# Patient Record
Sex: MALE | Race: WHITE | NOT HISPANIC OR LATINO | Employment: FULL TIME | URBAN - NONMETROPOLITAN AREA
[De-identification: names, ages, dates, MRNs, and addresses within clinical notes are randomized per-mention and may not be internally consistent; named-entity substitution may affect disease eponyms.]

---

## 2017-08-29 ENCOUNTER — OFFICE VISIT (OUTPATIENT)
Dept: URGENT CARE | Facility: PHYSICIAN GROUP | Age: 32
End: 2017-08-29
Payer: COMMERCIAL

## 2017-08-29 VITALS
DIASTOLIC BLOOD PRESSURE: 78 MMHG | WEIGHT: 162 LBS | HEIGHT: 70 IN | OXYGEN SATURATION: 97 % | TEMPERATURE: 99.9 F | RESPIRATION RATE: 18 BRPM | HEART RATE: 96 BPM | SYSTOLIC BLOOD PRESSURE: 122 MMHG | BODY MASS INDEX: 23.19 KG/M2

## 2017-08-29 DIAGNOSIS — J01.40 ACUTE PANSINUSITIS, RECURRENCE NOT SPECIFIED: ICD-10-CM

## 2017-08-29 PROCEDURE — 99214 OFFICE O/P EST MOD 30 MIN: CPT | Performed by: PHYSICIAN ASSISTANT

## 2017-08-29 RX ORDER — FLUTICASONE PROPIONATE 50 MCG
2 SPRAY, SUSPENSION (ML) NASAL DAILY
Qty: 1 BOTTLE | Refills: 1 | Status: SHIPPED | OUTPATIENT
Start: 2017-08-29 | End: 2017-09-03

## 2017-08-29 RX ORDER — AMOXICILLIN AND CLAVULANATE POTASSIUM 875; 125 MG/1; MG/1
1 TABLET, FILM COATED ORAL 2 TIMES DAILY
Qty: 14 TAB | Refills: 0 | Status: SHIPPED | OUTPATIENT
Start: 2017-08-29 | End: 2017-09-03

## 2017-08-29 ASSESSMENT — ENCOUNTER SYMPTOMS
FEVER: 0
SORE THROAT: 0
SINUS PRESSURE: 1
COUGH: 1
SHORTNESS OF BREATH: 0
WHEEZING: 0
HEADACHES: 1
CHILLS: 1

## 2017-08-29 NOTE — PROGRESS NOTES
"Subjective:      Maurilio Andres is a 31 y.o. male who presents with Sinus Problem (x2wks nasal pressure/pain, cough, drainage)            Almost 3 week history of rhinorrhea, congestion, sinus pressure/pain, cough, and generalized not feeling well. Over the last several days things have been worsening. He reports that he developed chills yesterday which were quite bothersome.      Sinus Problem   This is a new problem. The current episode started 1 to 4 weeks ago. The problem has been gradually worsening since onset. There has been no fever. The pain is moderate. Associated symptoms include chills, congestion, coughing, headaches and sinus pressure. Pertinent negatives include no shortness of breath or sore throat. Past treatments include nothing. The treatment provided no relief.       Review of Systems   Constitutional: Positive for chills. Negative for fever.   HENT: Positive for congestion and sinus pressure. Negative for sore throat.    Respiratory: Positive for cough. Negative for shortness of breath and wheezing.    Neurological: Positive for headaches.     Allergies:Review of patient's allergies indicates no known allergies.    Current Outpatient Prescriptions Ordered in [facility]   Medication Sig Dispense Refill   • amoxicillin-clavulanate (AUGMENTIN) 875-125 MG Tab Take 1 Tab by mouth 2 times a day for 7 days. 14 Tab 0   • fluticasone (FLONASE) 50 MCG/ACT nasal spray Spray 2 Sprays in nose every day. 1 Bottle 1     No current Epic-ordered facility-administered medications on file.        No past medical history on file.    Social History   Substance Use Topics   • Smoking status: Never Smoker   • Smokeless tobacco: Never Used   • Alcohol use No       No family status information on file.   History reviewed. No pertinent family history.       Objective:     /78   Pulse 96   Temp 37.7 °C (99.9 °F)   Resp 18   Ht 1.778 m (5' 10\")   Wt 73.5 kg (162 lb)   SpO2 97%   BMI 23.24 kg/m²      Physical Exam "   Constitutional: He is oriented to person, place, and time. He appears well-developed and well-nourished. No distress.   Appears uncomfortable   HENT:   Head: Normocephalic and atraumatic.   Right Ear: External ear normal.   Left Ear: External ear normal.   Mouth/Throat: Oropharynx is clear and moist.   Canals and tympanic membranes unremarkable. Nasal mucosal edema with mucopurulent nasal discharge. Frontal/maxillary sinus tenderness   Eyes: Right eye exhibits no discharge. Left eye exhibits no discharge.   Neck: Normal range of motion. Neck supple.   Cardiovascular: Normal rate and regular rhythm.    Pulmonary/Chest: Effort normal and breath sounds normal. He has no wheezes. He has no rales.   Neurological: He is alert and oriented to person, place, and time.   Skin: Skin is warm and dry. He is not diaphoretic.   Psychiatric: He has a normal mood and affect. His behavior is normal. Judgment and thought content normal.   Nursing note and vitals reviewed.              Assessment/Plan:     1. Acute pansinusitis, recurrence not specified      Ongoing for about 3 weeks, worsening over the last few days. Nasal mucosal edema with mucopurulent nasal discharge and sinus tenderness. Start Augmentin, Flonas       Elsevier Interactive Patient Education given:Sinusitis    Use Tylenol and/or ibuprofen as needed for pain or fever.  Use a Emir Med, Neti Pot, or other nasal irrigation device daily.  Use Flonase daily.  May try a short course of a decongestant such as Sudafed.  May try Afrin nasal spray for 3-5 days and then discontinue use.  May try an over-the-counter antihistamine such as Zyrtec, Claritin, or Allegra.  Use a cool mist humidifier with distilled water.  Elevate the head of your bed a few inches.  Drink plenty of fluids and get adequate rest.  Follow up with primary care provider in a few days if not improving.  Return for new or worsening symptoms.      Please note that this dictation was created using voice  recognition software. I have made every reasonable attempt to correct obvious errors, but I expect that there are errors of grammar and possibly content that I did not discover before finalizing the note.

## 2017-08-29 NOTE — PATIENT INSTRUCTIONS
Use Tylenol and/or ibuprofen as needed for pain or fever.  Use a Emir Med, Neti Pot, or other nasal irrigation device daily.  Use Flonase daily.  May try a short course of a decongestant such as Sudafed.  May try Afrin nasal spray for 3-5 days and then discontinue use.  May try an over-the-counter antihistamine such as Zyrtec, Claritin, or Allegra.  Use a cool mist humidifier with distilled water.  Elevate the head of your bed a few inches.  Drink plenty of fluids and get adequate rest.  Follow up with primary care provider in a few days if not improving.  Return for new or worsening symptoms.      Sinusitis, Adult  Sinusitis is redness, soreness, and inflammation of the paranasal sinuses. Paranasal sinuses are air pockets within the bones of your face. They are located beneath your eyes, in the middle of your forehead, and above your eyes. In healthy paranasal sinuses, mucus is able to drain out, and air is able to circulate through them by way of your nose. However, when your paranasal sinuses are inflamed, mucus and air can become trapped. This can allow bacteria and other germs to grow and cause infection.  Sinusitis can develop quickly and last only a short time (acute) or continue over a long period (chronic). Sinusitis that lasts for more than 12 weeks is considered chronic.  CAUSES  Causes of sinusitis include:  · Allergies.  · Structural abnormalities, such as displacement of the cartilage that separates your nostrils (deviated septum), which can decrease the air flow through your nose and sinuses and affect sinus drainage.  · Functional abnormalities, such as when the small hairs (cilia) that line your sinuses and help remove mucus do not work properly or are not present.  SIGNS AND SYMPTOMS  Symptoms of acute and chronic sinusitis are the same. The primary symptoms are pain and pressure around the affected sinuses. Other symptoms include:  · Upper toothache.  · Earache.  · Headache.  · Bad  breath.  · Decreased sense of smell and taste.  · A cough, which worsens when you are lying flat.  · Fatigue.  · Fever.  · Thick drainage from your nose, which often is green and may contain pus (purulent).  · Swelling and warmth over the affected sinuses.  DIAGNOSIS  Your health care provider will perform a physical exam. During your exam, your health care provider may perform any of the following to help determine if you have acute sinusitis or chronic sinusitis:  · Look in your nose for signs of abnormal growths in your nostrils (nasal polyps).  · Tap over the affected sinus to check for signs of infection.  · View the inside of your sinuses using an imaging device that has a light attached (endoscope).  If your health care provider suspects that you have chronic sinusitis, one or more of the following tests may be recommended:  · Allergy tests.  · Nasal culture. A sample of mucus is taken from your nose, sent to a lab, and screened for bacteria.  · Nasal cytology. A sample of mucus is taken from your nose and examined by your health care provider to determine if your sinusitis is related to an allergy.  TREATMENT  Most cases of acute sinusitis are related to a viral infection and will resolve on their own within 10 days. Sometimes, medicines are prescribed to help relieve symptoms of both acute and chronic sinusitis. These may include pain medicines, decongestants, nasal steroid sprays, or saline sprays.  However, for sinusitis related to a bacterial infection, your health care provider will prescribe antibiotic medicines. These are medicines that will help kill the bacteria causing the infection.  Rarely, sinusitis is caused by a fungal infection. In these cases, your health care provider will prescribe antifungal medicine.  For some cases of chronic sinusitis, surgery is needed. Generally, these are cases in which sinusitis recurs more than 3 times per year, despite other treatments.  HOME CARE  INSTRUCTIONS  · Drink plenty of water. Water helps thin the mucus so your sinuses can drain more easily.  · Use a humidifier.  · Inhale steam 3-4 times a day (for example, sit in the bathroom with the shower running).  · Apply a warm, moist washcloth to your face 3-4 times a day, or as directed by your health care provider.  · Use saline nasal sprays to help moisten and clean your sinuses.  · Take medicines only as directed by your health care provider.  · If you were prescribed either an antibiotic or antifungal medicine, finish it all even if you start to feel better.  SEEK IMMEDIATE MEDICAL CARE IF:  · You have increasing pain or severe headaches.  · You have nausea, vomiting, or drowsiness.  · You have swelling around your face.  · You have vision problems.  · You have a stiff neck.  · You have difficulty breathing.     This information is not intended to replace advice given to you by your health care provider. Make sure you discuss any questions you have with your health care provider.     Document Released: 12/18/2006 Document Revised: 01/08/2016 Document Reviewed: 01/01/2013  [employer] Interactive Patient Education ©2016 [employer]

## 2017-09-03 ENCOUNTER — OFFICE VISIT (OUTPATIENT)
Dept: URGENT CARE | Facility: PHYSICIAN GROUP | Age: 32
End: 2017-09-03
Payer: COMMERCIAL

## 2017-09-03 VITALS
BODY MASS INDEX: 23.82 KG/M2 | RESPIRATION RATE: 16 BRPM | DIASTOLIC BLOOD PRESSURE: 86 MMHG | SYSTOLIC BLOOD PRESSURE: 148 MMHG | OXYGEN SATURATION: 98 % | HEART RATE: 114 BPM | WEIGHT: 166 LBS | TEMPERATURE: 98.1 F

## 2017-09-03 DIAGNOSIS — J01.00 ACUTE NON-RECURRENT MAXILLARY SINUSITIS: ICD-10-CM

## 2017-09-03 PROCEDURE — 99214 OFFICE O/P EST MOD 30 MIN: CPT | Performed by: PHYSICIAN ASSISTANT

## 2017-09-03 RX ORDER — DOXYCYCLINE HYCLATE 100 MG/1
100 CAPSULE ORAL 2 TIMES DAILY
Qty: 20 CAP | Refills: 0 | Status: SHIPPED | OUTPATIENT
Start: 2017-09-03 | End: 2018-10-19

## 2017-09-03 RX ORDER — METHYLPREDNISOLONE 4 MG/1
TABLET ORAL
Qty: 21 TAB | Refills: 0 | Status: SHIPPED | OUTPATIENT
Start: 2017-09-03 | End: 2018-10-19

## 2017-09-03 NOTE — PROGRESS NOTES
Chief Complaint   Patient presents with   • Cough     with congestion and sore throat x 3 weeks        HISTORY OF PRESENT ILLNESS: Patient is a 31 y.o. male who presents today for 3 weeks of feeling unwell.  He was seen and dx with acute maxillary sinusitis and put on Augmentin and Flonase.  He has not gotten any relief with this regimen.  He does not have allergies and all of this seemed to start with a cold/sore throat/etc.   He feels pressure throughout his sinuses with no focal area of pain.  No dizziness.  No nausea or vomiting.  No chest congestion, SOB or chest pain.     There are no active problems to display for this patient.      Allergies:Review of patient's allergies indicates no known allergies.    Current Outpatient Prescriptions Ordered in [facility]   Medication Sig Dispense Refill   • fluticasone (FLONASE) 50 MCG/ACT nasal spray Spray 2 Sprays in nose every day. 1 Bottle 1     No current Epic-ordered facility-administered medications on file.        No past medical history on file.    Social History   Substance Use Topics   • Smoking status: Never Smoker   • Smokeless tobacco: Never Used   • Alcohol use No       No family status information on file.   History reviewed. No pertinent family history.    ROS:  Review of Systems   Constitutional: Negative for fever, chills, weight loss and malaise/fatigue.   HENT: Negative for ear pain, nosebleeds, congestion, sore throat and neck pain.    Eyes: Negative for blurred vision.   Respiratory: Negative for cough, sputum production, shortness of breath and wheezing.    Cardiovascular: Negative for chest pain, palpitations, orthopnea and leg swelling.   Gastrointestinal: Negative for heartburn, nausea, vomiting and abdominal pain.   All other systems reviewed and are negative.       Exam:  Blood pressure 148/86, pulse (!) 114, temperature 36.7 °C (98.1 °F), resp. rate 16, weight 75.3 kg (166 lb), SpO2 98 %.  General:  Well nourished, well developed male in NAD  Eyes:  PERRLA, EOM within normal limits, no conjunctival injection, no scleral icterus, visual fields and acuity grossly intact.  Ears: Normal shape and symmetry, no tenderness, no discharge. External canals are without any significant edema or erythema. Tympanic membranes are without any inflammation, no effusion. Gross auditory acuity is intact  Nose: Symmetrical, mild bilateral maxillary sinuses TTP, boggy erythematous turbinates with purulent rhinorrhea.   Mouth: reasonable hygiene, no erythema exudates or tonsillar enlargement.  Purulent post nasal drainage.   Neck: no masses, range of motion within normal limits, no tracheal deviation. No lymphadenopathy  Pulmonary: Normal respiratory effort, no wheezes, crackles, or rhonchi.  Cardiovascular: regular rate and rhythm without murmurs, rubs, or gallops.  Skin: No visible rashes or lesion. Warm, pink, dry.   Extremities: no clubbing, cyanosis, or edema.  Neuro: A&O x 3. Speech normal/clear.  Normal gait.         Assessment/Plan:  1. Acute non-recurrent maxillary sinusitis  MethylPREDNISolone (MEDROL DOSEPAK) 4 MG Tablet Therapy Pack    doxycycline (VIBRAMYCIN) 100 MG Cap       -consistent with bacterial sinusitis.   Sinus care at home discussed, ie Netti Pot  -fluids, rest emphasized.  Flonase/Allegra or similar for post nasal drainage trial.   -humidifier/steam inhalations to soothe lungs.   -rx as above.  Switch Augmentin to Doxy and add Medrol dose pack for inflammation    Supportive care, differential diagnoses, and indications for immediate follow-up discussed with patient.   Pathogenesis of diagnosis discussed including typical length and natural progression.   Instructed to return to clinic or nearest emergency department for any change in condition, further concerns, or worsening of symptoms.  Patient states understanding of the plan of care and discharge instructions.      Paige Church P.A.-C.

## 2018-10-01 ENCOUNTER — OCCUPATIONAL MEDICINE (OUTPATIENT)
Dept: URGENT CARE | Facility: PHYSICIAN GROUP | Age: 33
End: 2018-10-01
Payer: COMMERCIAL

## 2018-10-01 ENCOUNTER — NON-PROVIDER VISIT (OUTPATIENT)
Dept: URGENT CARE | Facility: PHYSICIAN GROUP | Age: 33
End: 2018-10-01

## 2018-10-01 VITALS
TEMPERATURE: 99.1 F | RESPIRATION RATE: 16 BRPM | HEART RATE: 110 BPM | SYSTOLIC BLOOD PRESSURE: 150 MMHG | DIASTOLIC BLOOD PRESSURE: 80 MMHG | WEIGHT: 165 LBS | OXYGEN SATURATION: 98 % | BODY MASS INDEX: 23.62 KG/M2 | HEIGHT: 70 IN

## 2018-10-01 DIAGNOSIS — S09.90XA INJURY OF HEAD, INITIAL ENCOUNTER: ICD-10-CM

## 2018-10-01 DIAGNOSIS — S16.1XXA STRAIN OF NECK MUSCLE, INITIAL ENCOUNTER: ICD-10-CM

## 2018-10-01 DIAGNOSIS — Z02.1 PRE-EMPLOYMENT DRUG SCREENING: ICD-10-CM

## 2018-10-01 LAB
BREATH ALCOHOL COMMENT: NORMAL
POC BREATHALIZER: 0 PERCENT (ref 0–0.01)

## 2018-10-01 PROCEDURE — 99203 OFFICE O/P NEW LOW 30 MIN: CPT | Mod: 29 | Performed by: PHYSICIAN ASSISTANT

## 2018-10-01 PROCEDURE — 82075 ASSAY OF BREATH ETHANOL: CPT | Performed by: PHYSICIAN ASSISTANT

## 2018-10-01 PROCEDURE — 8907 PR URINE COLLECT ONLY: Performed by: PHYSICIAN ASSISTANT

## 2018-10-01 RX ORDER — DICLOFENAC SODIUM 75 MG/1
75 TABLET, DELAYED RELEASE ORAL NIGHTLY PRN
Qty: 15 TAB | Refills: 0 | Status: SHIPPED | OUTPATIENT
Start: 2018-10-01 | End: 2018-10-19

## 2018-10-01 RX ORDER — CYCLOBENZAPRINE HCL 10 MG
10 TABLET ORAL 3 TIMES DAILY PRN
Qty: 30 TAB | Refills: 0 | Status: SHIPPED | OUTPATIENT
Start: 2018-10-01 | End: 2018-10-19

## 2018-10-01 NOTE — LETTER
[facility]  [facility] [place] [address]  Phone:  [phone] - Fax:  [phone]   [facility] Progress Report and Disability Certification  Date of Service: 10/1/2018   No Show:  No  Date / Time of Next Visit: 10/8/2018   Claim Information   Patient Name: Maurilio Andres  Claim Number:     Employer:   [employer] Date of Injury: 9/29/2018     Insurer / TPA: Monica Goldsmith  ID / SSN:     Occupation:   Diagnosis: Diagnoses of Injury of head, initial encounter and Strain of neck muscle, initial encounter were pertinent to this visit.    Medical Information   Related to Industrial Injury? Yes    Subjective Complaints:  Left temporal pain, left-sided neck pain after injury at work 2 days ago   Objective Findings: Neurologic: Alert and oriented x3, cranial nerves II through XII checked and are grossly intact, negative Romberg, normal gait.  Musculoskeletal: Tenderness to palpation of the paraspinous musculature and tissues on the left side of his C-spine, no vertebral point tenderness.  He also has some tenderness to his temple area where he was hit     Pre-Existing Condition(s):     Assessment:   Initial Visit    Status: Additional Care Required  Comments:Follow-up in 1 week  Permanent Disability:No    Plan: MedicationMedication (NOT at Work)  Comments:Do not take muscle relaxant while driving or at work    Diagnostics:      Comments:       Disability Information   Status: Released to Full Duty    From:  10/1/2018  Through: 10/8/2018 Restrictions are: Temporary   Physical Restrictions   Sitting:    Standing:    Stooping:    Bending:      Squatting:    Walking:    Climbing:    Pushing:      Pulling:    Other:    Reaching Above Shoulder (L):   Reaching Above Shoulder (R):       Reaching Below Shoulder (L):    Reaching Below Shoulder (R):      Not to exceed Weight Limits   Carrying(hrs):   Weight Limit(lb):   Lifting(hrs):   Weight  Limit(lb):        Comments: May return to work without restrictions, however follow-up required in 1 week    Repetitive Actions   Hands: i.e. Fine Manipulations from Grasping:     Feet: i.e. Operating Foot Controls:     Driving / Operate Machinery:     Physician Name: Priscila Cook P.A.-C. Physician Signature: PRISCILA Aguillon P.A.-C. e-Signature: Dr. Tavo Alfredo, Medical Director   Clinic Name / Location: [address] [address] Clinic Phone Number: Dept: [phone]   Appointment Time: 12:40 Pm Visit Start Time: 12:48 PM   Check-In Time:  12:46 Pm Visit Discharge Time:  1:13 PM   Original-Treating Physician or Chiropractor    Page 2-Insurer/TPA    Page 3-Employer    Page 4-Employee

## 2018-10-01 NOTE — PROGRESS NOTES
Chief Complaint   Patient presents with   • Headache     hit left side of head on saturday, constant headache since        HISTORY OF PRESENT ILLNESS: Patient is a 32 y.o. male who presents today because he is here for work comp injury.    Date of injury 9/29/2018 at approximately 10 AM.  This is his first visit for this injury.    He was changing some equipment and a tight spot at work 2 days ago and hit the left temple area on a piece of metal.  He did not have any loss of consciousness, no visual disturbances or changes, no nausea or vomiting.  He said some swear words and went about his business.  He worked the entire next day.  He took some aspirin and some over-the-counter anti-inflammatories and it did not seem to help.  The pain radiates down the left side of his neck now.      There are no active problems to display for this patient.      Allergies:Patient has no known allergies.    Current Outpatient Prescriptions Ordered in [facility]   Medication Sig Dispense Refill   • cyclobenzaprine (FLEXERIL) 10 MG Tab Take 1 Tab by mouth 3 times a day as needed. 30 Tab 0   • diclofenac EC (VOLTAREN) 75 MG Tablet Delayed Response Take 1 Tab by mouth at bedtime as needed. 15 Tab 0   • MethylPREDNISolone (MEDROL DOSEPAK) 4 MG Tablet Therapy Pack Take as directed 21 Tab 0   • doxycycline (VIBRAMYCIN) 100 MG Cap Take 1 Cap by mouth 2 times a day. 20 Cap 0     No current Epic-ordered facility-administered medications on file.        History reviewed. No pertinent past medical history.    Social History   Substance Use Topics   • Smoking status: Never Smoker   • Smokeless tobacco: Never Used   • Alcohol use Yes      Comment: socially        No family status information on file.   History reviewed. No pertinent family history.    ROS:  Review of Systems   Constitutional: Negative for fever, chills, weight loss and malaise/fatigue.   HENT: Negative for ear pain, nosebleeds, congestion, sore throat and positive for neck pain.   "  Eyes: Negative for blurred vision.   Respiratory: Negative for cough, sputum production, shortness of breath and wheezing.    Cardiovascular: Negative for chest pain, palpitations, orthopnea and leg swelling.   Gastrointestinal: Negative for heartburn, nausea, vomiting and abdominal pain.   Genitourinary: Negative for dysuria, urgency and frequency.     Exam:  Blood pressure 150/80, pulse (!) 110, temperature 37.3 °C (99.1 °F), resp. rate 16, height 1.778 m (5' 10\"), weight 74.8 kg (165 lb), SpO2 98 %.  General:  Well nourished, well developed male in NAD  Head:Normocephalic, atraumatic  Eyes: PERRLA, EOM within normal limits, no conjunctival injection, no scleral icterus, visual fields and acuity grossly intact.  Ears: Normal shape and symmetry, no tenderness, no discharge. External canals are without any significant edema or erythema. Tympanic membranes are without any inflammation, no effusion. Gross auditory acuity is intact  Nose: Symmetrical without tenderness, no discharge.  Mouth: reasonable hygiene, no erythema exudates or tonsillar enlargement.  Neck: no masses, range of motion within normal limits, no tracheal deviation. No obvious thyroid enlargement.  Extremities: no clubbing, cyanosis, or edema.  Neurologic: Alert and oriented x3, cranial nerves II through XII checked and are grossly intact, negative Romberg, normal gait.  Musculoskeletal: Tenderness to palpation of the paraspinous musculature and tissues on the left side of his C-spine, no vertebral point tenderness.  He also has some tenderness to his temple area where he was hit    Please note that this dictation was created using voice recognition software. I have made every reasonable attempt to correct obvious errors, but I expect that there are errors of grammar and possibly content that I did not discover before finalizing the note.    Assessment/Plan:  1. Injury of head, initial encounter  diclofenac EC (VOLTAREN) 75 MG Tablet Delayed " Response   2. Strain of neck muscle, initial encounter  cyclobenzaprine (FLEXERIL) 10 MG Tab       Emergency room precautions given, follow-up in 1 week.

## 2018-10-01 NOTE — LETTER
"EMPLOYEE’S CLAIM FOR COMPENSATION/ REPORT OF INITIAL TREATMENT  FORM C-4    EMPLOYEE’S CLAIM - PROVIDE ALL INFORMATION REQUESTED   First Name  Maurilio Last Name  Dmitry Birthdate                    1985                Sex  male Claim Number   Home Address  [address] Age  32 y.o. Height  1.778 m (5' 10\") Weight  74.8 kg (165 lb) N     [facility] Zip  [address] Telephone  [phone] (home)    Mailing Address  [address] [facility]  [address] Primary Language Spoken  English    Insurer   Third Party   Monica Goldsmith   Employee's Occupation (Job Title) When Injury or Occupational Disease Occurred      Employer's Name    [employer] Telephone   [phone]   Employer Address   [address]  [facility]   [id number]   Date of Injury  9/29/2018               Hour of Injury  11:00 AM Date Employer Notified  10/1/2018 Last Day of Work after Injury or Occupational Disease  10/1/2018 Supervisor to Whom Injury Reported  Yeyo   Address or Location of Accident (if applicable)  [[place] [id number]]   What were you doing at the time of accident? (if applicable)  in a man lift changing rollers    How did this injury or occupational disease occur? (Be specific an answer in detail. Use additional sheet if necessary)  Working in a tight spot was lifting up a roller when done turned into mariia side of my head   If you believe that you have an occupational disease, when did you first have knowledge of the disability and it relationship to your employment?  NA Witnesses to the Accident  NA      Nature of Injury or Occupational Disease  Defer  Part(s) of Body Injured or Affected  Skull, ,     I certify that the above is true and correct to the best of my knowledge and that I have provided this information in order to obtain the benefits of [place] and " [place]  I hereby authorize any physician, chiropractor, surgeon, practitioner, or other person, any hospital, including [facility] or [facility], any medical service organization, any insurance company, or other institution or organization to release to each other, any medical or other information, including benefits paid or payable, pertinent to this injury or disease, except information relative to diagnosis, treatment and/or counseling for AIDS, psychological conditions, alcohol or controlled substances, for which I must give specific authorization.  A Photostat of this authorization shall be as valid as the original.     Date 10/01/2018   [facility]   Employee’s Signature   THIS REPORT MUST BE COMPLETED AND MAILED WITHIN 3 WORKING DAYS OF TREATMENT   [facility]  Name of Facility  [facility]   Date  10/1/2018 Diagnosis  (S09.90XA) Injury of head, initial encounter  (S16.1XXA) Strain of neck muscle, initial encounter Is there evidence the injured employee was under the influence of alcohol and/or another controlled substance at the time of accident?   Hour  12:48 PM Description of Injury or Disease  Diagnoses of Injury of head, initial encounter and Strain of neck muscle, initial encounter were pertinent to this visit. No   Treatment  Prescription anti-inflammatory, muscle relaxant in the evenings  Have you advised the patient to remain off work five days or more? No   X-Ray Findings      If Yes   From Date  To Date      From information given by the employee, together with medical evidence, can you directly connect this injury or occupational disease as job incurred?  Yes If No Full Duty  Yes Modified Duty      Is additional medical care by a physician indicated?  Yes  Comments:Follow-up in 1 week If Modified Duty, Specify any Limitations / Restrictions      Do you know of any previous injury  "or disease contributing to this condition or occupational disease?                            No   Date  10/1/2018 Print Doctor’s Name Priscila Cook P.A.-C. I certify the employer’s copy of  this form was mailed on:   Address  [address] Insurer’s Use Only     [facility] Zip  [address]    Provider’s Tax ID Number  [id number] Telephone  Dept: [phone]        e-JohnieTAPRISCILA ALARCON P.A.-C.   e-Signature: Dr. Tavo Alfredo, Medical Director Degree           ORIGINAL-TREATING PHYSICIAN OR CHIROPRACTOR    PAGE 2-INSURER/TPA    PAGE 3-EMPLOYER    PAGE 4-EMPLOYEE             Form C-4 (rev10/07)              BRIEF DESCRIPTION OF RIGHTS AND BENEFITS  (Pursuant to [place] 616C.050)    Notice of Injury or Occupational Disease (Incident Report Form C-1): If an injury or occupational disease (OD) arises out of and in the  course of employment, you must provide written notice to your employer as soon as practicable, but no later than 7 days after the accident or  OD. Your employer shall maintain a sufficient supply of the required forms.    Claim for Compensation (Form C-4): If medical treatment is sought, the form C-4 is available at the place of initial treatment. A completed  \"Claim for Compensation\" (Form C-4) must be filed within 90 days after an accident or OD. The treating physician or chiropractor must,  within 3 working days after treatment, complete and mail to the employer, the employer's insurer and third-party , the Claim for  Compensation.    Medical Treatment: If you require medical treatment for your on-the-job injury or OD, you may be required to select a physician or  chiropractor from a list provided by your workers’ compensation insurer, if it has contracted with an Organization for Managed Care (MCO) or  Preferred Provider Organization (PPO) or providers of health care. If your employer has not entered into a contract with an MCO or PPO, you  may select a physician or " chiropractor from the Panel of Physicians and Chiropractors. Any medical costs related to your industrial injury or  OD will be paid by your insurer.    Temporary Total Disability (TTD): If your doctor has certified that you are unable to work for a period of at least 5 consecutive days, or 5  cumulative days in a 20-day period, or places restrictions on you that your employer does not accommodate, you may be entitled to TTD  compensation.    Temporary Partial Disability (TPD): If the wage you receive upon reemployment is less than the compensation for TTD to which you are  entitled, the insurer may be required to pay you TPD compensation to make up the difference. TPD can only be paid for a maximum of 24  months.    Permanent Partial Disability (PPD): When your medical condition is stable and there is an indication of a PPD as a result of your injury or  OD, within 30 days, your insurer must arrange for an evaluation by a rating physician or chiropractor to determine the degree of your PPD. The  amount of your PPD award depends on the date of injury, the results of the PPD evaluation and your age and wage.    Permanent Total Disability (PTD): If you are medically certified by a treating physician or chiropractor as permanently and totally disabled  and have been granted a PTD status by your insurer, you are entitled to receive monthly benefits not to exceed 66 2/3% of your average  monthly wage. The amount of your PTD payments is subject to reduction if you previously received a PPD award.    Vocational Rehabilitation Services: You may be eligible for vocational rehabilitation services if you are unable to return to the job due to a  permanent physical impairment or permanent restrictions as a result of your injury or occupational disease.    Transportation and Per Fermin Reimbursement: You may be eligible for travel expenses and per fermin associated with medical treatment.    Reopening: You may be able to reopen your  claim if your condition worsens after claim closure.    Appeal Process: If you disagree with a written determination issued by the insurer or the insurer does not respond to your request, you may  appeal to the Department of Administration, , by following the instructions contained in your determination letter. You must  appeal the determination within 70 days from the date of the determination letter at [address], [address], [place], [place]  [address], or [address], [place], [place] [address]. If you disagree with the  decision, you may appeal to the  Department of Administration, . You must file your appeal within 30 days from the date of the  decision  letter at [address], [address], [place], [place] [address], or [address], [place], [place] [address]. If you  disagree with a decision of an , you may file a petition for judicial review with the District Court. You must do so within 30  days of the Appeal Officer’s decision. You may be represented by an  at your own expense or you may contact the [facility] for possible  representation.    [place]  for Injured Workers (NAIW): If you disagree with a  decision, you may request that NAIW represent you  without charge at an  Hearing. For information regarding denial of benefits, you may contact the [facility] at: [address], [address], [place] [address], [phone], or [address], [place] [address], [phone]    To File a Complaint with the Division: If you wish to file a complaint with the  of the Division of Industrial Relations (DIR),  please contact the Workers’ Compensation Section, [address], [address], [place], [place] [address], telephone [phone], or  [address], [place]  [address], telephone [phone].    For assistance with Workers’ Compensation Issues: you may contact the Office of the Governor Consumer Health Assistance, [address] [place] [address], [facility] [address], [place] [address], Toll Free 1-628.681.7507, Web site: [email or web address]., E-mail  [email or web address].                                                                                                                                                                                                                                   __________________________________________________________________                                                                   [phone]__________                Employee Name / Signature                                                                                                                                                       Date                                                                                                                                                                                                     D-2 (rev. 10/07)

## 2018-10-19 ENCOUNTER — OFFICE VISIT (OUTPATIENT)
Dept: MEDICAL GROUP | Facility: PHYSICIAN GROUP | Age: 33
End: 2018-10-19
Payer: COMMERCIAL

## 2018-10-19 VITALS
HEART RATE: 83 BPM | OXYGEN SATURATION: 98 % | BODY MASS INDEX: 23.62 KG/M2 | DIASTOLIC BLOOD PRESSURE: 78 MMHG | HEIGHT: 70 IN | WEIGHT: 165 LBS | TEMPERATURE: 97.5 F | RESPIRATION RATE: 16 BRPM | SYSTOLIC BLOOD PRESSURE: 116 MMHG

## 2018-10-19 DIAGNOSIS — R04.0 EPISTAXIS: ICD-10-CM

## 2018-10-19 DIAGNOSIS — Z13.29 SCREENING FOR THYROID DISORDER: ICD-10-CM

## 2018-10-19 DIAGNOSIS — J02.0 STREP PHARYNGITIS: ICD-10-CM

## 2018-10-19 DIAGNOSIS — Z13.6 SCREENING FOR CARDIOVASCULAR CONDITION: ICD-10-CM

## 2018-10-19 DIAGNOSIS — R90.89 ABNORMAL BRAIN CT: ICD-10-CM

## 2018-10-19 DIAGNOSIS — Z13.21 ENCOUNTER FOR VITAMIN DEFICIENCY SCREENING: ICD-10-CM

## 2018-10-19 DIAGNOSIS — R51.9 NONINTRACTABLE HEADACHE, UNSPECIFIED CHRONICITY PATTERN, UNSPECIFIED HEADACHE TYPE: ICD-10-CM

## 2018-10-19 PROCEDURE — 99214 OFFICE O/P EST MOD 30 MIN: CPT | Performed by: NURSE PRACTITIONER

## 2018-10-19 RX ORDER — AMOXICILLIN 500 MG/1
500 CAPSULE ORAL 2 TIMES DAILY
Qty: 20 CAP | Refills: 0 | Status: SHIPPED | OUTPATIENT
Start: 2018-10-19 | End: 2021-02-11

## 2018-10-19 ASSESSMENT — PAIN SCALES - GENERAL: PAINLEVEL: NO PAIN

## 2018-10-19 ASSESSMENT — PATIENT HEALTH QUESTIONNAIRE - PHQ9: CLINICAL INTERPRETATION OF PHQ2 SCORE: 0

## 2018-10-19 NOTE — ASSESSMENT & PLAN NOTE
Patient reports pharyngitis and sinus congestion, onset 1 week ago.  Wife and child both have tested positive for strep throat.  Patient denies malaise, fever, chills, otalgia, diarrhea, cough.  Rapid strep test done today and is positive.  Will treat with amoxicillin 500 mg twice a day for 10 days.  Reviewed instructions and side effects of medication with patient.  Advised to eat yogurt with live cultures or take probiotic while taking antibiotic.  Advised to take with food.  Patient will return to clinic if symptoms worsen or do not improve.

## 2018-10-19 NOTE — ASSESSMENT & PLAN NOTE
Patient reports frequent bloody noses occurring since childhood.  Reports will get a bloody nose every day 1 week and then not have a bloody nose for a couple weeks.  Patient reports bleeding lasts only a few minutes.  He has not tried anything for this.  I have advised him use saline nasal spray 4 times a day to see if this helps with frequency of bloody noses.  Patient denies allergies.  Will get CBC.  Patient will return to clinic if saline nasal spray does not seem to help decrease number of episodes of bloody noses.

## 2018-10-19 NOTE — ASSESSMENT & PLAN NOTE
Patient presents today with brain CT report from 10/5/18 done at [facility] in [place].  CT report impression is:  1.  No acute intracranial abnormalities are identified  2.  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  Cyst is an incidental finding as patient had CT scan for work related head injury.  Patient reports he has had headaches since childhood, occurring 2-3 times a week.  Sometimes headaches are resolved with aspirin, sometimes not.  If patient does not treat headache before he goes to bed, he wakes with a very bad hangover-like headache.  Denies photophobia, phonophobia, nausea with headaches.  Patient reports he can remain functional during headaches.  Will refer to neurology.

## 2018-10-19 NOTE — PROGRESS NOTES
CC: To establish care and for abnormality found on brain CT, sore throat, bloody noses    HISTORY OF THE PRESENT ILLNESS: Patient is a 32 y.o. male. This pleasant patient is here today, accompanied by wife and 3 children, to establish care and for the following health problems.  Patient works at [employer] full-time.  He does not exercise routinely.  He does not smoke.      Nonintractable headache  Patient presents today with brain CT report from 10/5/18 done at [facility] in [place].  CT report impression is:  1.  No acute intracranial abnormalities are identified  2.  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  Cyst is an incidental finding as patient had CT scan for work related head injury.  Patient reports he has had headaches since childhood, occurring 2-3 times a week.  Sometimes headaches are resolved with aspirin, sometimes not.  If patient does not treat headache before he goes to bed, he wakes with a very bad hangover-like headache.  Denies photophobia, phonophobia, nausea with headaches.  Patient reports he can remain functional during headaches.  Will refer to neurology.    Strep pharyngitis  Patient reports pharyngitis and sinus congestion, onset 1 week ago.  Wife and child both have tested positive for strep throat.  Patient denies malaise, fever, chills, otalgia, diarrhea, cough.  Rapid strep test done today and is positive.  Will treat with amoxicillin 500 mg twice a day for 10 days.  Reviewed instructions and side effects of medication with patient.  Advised to eat yogurt with live cultures or take probiotic while taking antibiotic.  Advised to take with food.  Patient will return to clinic if symptoms worsen or do not improve.    Epistaxis  Patient reports frequent bloody noses occurring since childhood.  Reports will get a bloody nose every day 1 week and then not have a bloody nose for a couple weeks.  Patient reports bleeding lasts only a few  "minutes.  He has not tried anything for this.  I have advised him use saline nasal spray 4 times a day to see if this helps with frequency of bloody noses.  Patient denies allergies.  Will get CBC.      Allergies: Patient has no known allergies.    Current Outpatient Prescriptions Ordered in [facility]   Medication Sig Dispense Refill   • amoxicillin (AMOXIL) 500 MG Cap Take 1 Cap by mouth 2 times a day. 20 Cap 0     No current Epic-ordered facility-administered medications on file.        No past medical history on file.    Past Surgical History:   Procedure Laterality Date   • ANKLE ORIF Right 2006       Social History   Substance Use Topics   • Smoking status: Former Smoker     Packs/day: 0.25     Years: 1.00     Types: Cigarettes     Quit date: 2012   • Smokeless tobacco: Never Used   • Alcohol use 0.6 oz/week     1 Cans of beer per week      Comment: socially        History reviewed. No pertinent family history.    ROS:     - Constitutional: Negative for fever, chills, unexpected weight change, and fatigue/generalized weakness.     - HEENT: As in HPI.      - Respiratory: Negative for cough, dyspnea, and wheezing.      - Cardiovascular: Negative for chest pain, palpitations, and bilateral lower extremity edema.     - Gastrointestinal: Negative for abdominal pain, melena, diarrhea, constipation.     - Genitourinary: Negative for dysuria, polyuria, hematuria.    - Musculoskeletal: Negative for myalgias, back pain, and joint pain.     - Skin: Negative for rash, itching, cyanotic skin color change.     - Neurological: Negative for dizziness, tingling, tremors, focal sensory deficit, focal weakness and headaches.     - Endo/Heme/Allergies: Does not bruise/bleed easily.     - Psychiatric/Behavioral: Negative for depression, suicidal/homicidal ideation and memory loss.          Exam: Blood pressure 116/78, pulse 83, temperature 36.4 °C (97.5 °F), temperature source Temporal, resp. rate 16, height 1.778 m (5' 10\"), weight " 74.8 kg (165 lb), SpO2 98 %. Body mass index is 23.68 kg/m².    General: Alert, pleasant, well nourished, well developed male in NAD  HEENT: Normocephalic. Eyes conjunctiva clear lids without ptosis, pupils equal and reactive to light, ears normal shape and contour, canals are clear bilaterally, tympanic membranes are pearly gray with good light reflex, nasal mucosa without erythema and drainage, oropharynx is erythematous without edema or exudates.   Neck: Supple without bruit. Thyroid is not enlarged.  Pulmonary: Clear to ausculation.  Normal effort. No rales, ronchi, or wheezing.  Cardiovascular: Normal rate and rhythm without murmur. Carotid and radial pulses are intact and equal bilaterally.  No lower extremity edema.  Abdomen: Soft, nontender, nondistended. Normal bowel sounds. Liver and spleen are not palpable  Neuro: CN 2-12 tested and grossly intact, strength testing in upper and lower extremities is 5/5 and equal bilaterally, lower extremity deep tendon reflexes 2+ and equal bilaterally, (Gross motor movement intact in all 4 extremities, Gross sensation intact to extremities and trunk, Gait grossly normal and not antalgic  Lymph: No cervical or supraclavicular lymph nodes are palpable  Skin: Warm and dry.  No obvious lesions.  Musculoskeletal: Normal gait.   Psych: Normal mood and affect. Alert and oriented. Judgment and insight is normal.    Rapid Strep: positive strep A    Please note that this dictation was created using voice recognition software. I have made every reasonable attempt to correct obvious errors, but I expect that there are errors of grammar and possibly content that I did not discover before finalizing the note.      Assessment/Plan  1. Abnormal brain CT    - REFERRAL TO NEUROLOGY    2. Nonintractable headache, unspecified chronicity pattern, unspecified headache type    - REFERRAL TO NEUROLOGY    3. Strep pharyngitis    - amoxicillin (AMOXIL) 500 MG Cap; Take 1 Cap by mouth 2 times a  day.  Dispense: 20 Cap; Refill: 0    4. Epistaxis    - CBC WITH DIFFERENTIAL; Future    5. Screening for cardiovascular condition    - CBC WITH DIFFERENTIAL; Future  - COMP METABOLIC PANEL; Future    6. Encounter for vitamin deficiency screening    - VITAMIN D,25 HYDROXY; Future    7. Screening for thyroid disorder    - TSH; Future  - FREE THYROXINE; Future    Patient will get lab work done.  I will notify him of lab results via my chart.  Patient will return to clinic annually or sooner if needed.

## 2018-11-28 ENCOUNTER — OFFICE VISIT (OUTPATIENT)
Dept: URGENT CARE | Facility: PHYSICIAN GROUP | Age: 33
End: 2018-11-28
Payer: COMMERCIAL

## 2018-11-28 VITALS
WEIGHT: 163 LBS | HEART RATE: 112 BPM | SYSTOLIC BLOOD PRESSURE: 140 MMHG | HEIGHT: 70 IN | BODY MASS INDEX: 23.34 KG/M2 | TEMPERATURE: 98 F | OXYGEN SATURATION: 98 % | DIASTOLIC BLOOD PRESSURE: 82 MMHG | RESPIRATION RATE: 14 BRPM

## 2018-11-28 DIAGNOSIS — J01.40 ACUTE PANSINUSITIS, RECURRENCE NOT SPECIFIED: ICD-10-CM

## 2018-11-28 PROCEDURE — 99214 OFFICE O/P EST MOD 30 MIN: CPT | Performed by: PHYSICIAN ASSISTANT

## 2018-11-28 RX ORDER — AMOXICILLIN AND CLAVULANATE POTASSIUM 875; 125 MG/1; MG/1
1 TABLET, FILM COATED ORAL 2 TIMES DAILY
Qty: 20 TAB | Refills: 0 | Status: SHIPPED | OUTPATIENT
Start: 2018-11-28 | End: 2018-12-08

## 2018-11-28 NOTE — PROGRESS NOTES
Chief Complaint   Patient presents with   • Pharyngitis       HISTORY OF PRESENT ILLNESS: Patient is a 33 y.o. male who presents today because he has a 10-day history of a biphasic illness.  He thought he had a cold, had some nasal congestion and a sore throat and cough.  Those symptoms went away after about 4 5 days and he felt good for a day or 2 and then his symptoms returned with worsening right-sided nasal sinus pain, pressure, drainage and congestion, sore throat, minimal cough.  He has been taking some over-the-counter decongestants for symptoms without improvement.    Patient Active Problem List    Diagnosis Date Noted   • Abnormal brain CT 10/19/2018   • Nonintractable headache 10/19/2018   • Strep pharyngitis 10/19/2018   • Epistaxis 10/19/2018       Allergies:Patient has no known allergies.    Current Outpatient Prescriptions Ordered in [facility]   Medication Sig Dispense Refill   • amoxicillin-clavulanate (AUGMENTIN) 875-125 MG Tab Take 1 Tab by mouth 2 times a day for 10 days. 20 Tab 0   • amoxicillin (AMOXIL) 500 MG Cap Take 1 Cap by mouth 2 times a day. 20 Cap 0     No current Epic-ordered facility-administered medications on file.        No past medical history on file.    Social History   Substance Use Topics   • Smoking status: Former Smoker     Packs/day: 0.25     Years: 1.00     Types: Cigarettes     Quit date: 2012   • Smokeless tobacco: Never Used   • Alcohol use 0.6 oz/week     1 Cans of beer per week      Comment: socially        Family Status   Relation Status   • Mo Alive, age 56y   • Fa Alive, age 65y   • Sis Alive   • Bro Alive   No family history on file.    ROS:  Review of Systems   Constitutional: Negative for fever, chills, weight loss and malaise/fatigue.   HENT: Negative for ear pain, nosebleeds, positive for congestion, sore throat and neck pain.    Eyes: Negative for blurred vision.   Respiratory: Positive for minimal cough, no sputum production, shortness of breath and wheezing.   "  Cardiovascular: Negative for chest pain, palpitations, orthopnea and leg swelling.   Gastrointestinal: Negative for heartburn, nausea, vomiting and abdominal pain.   Genitourinary: Negative for dysuria, urgency and frequency.     Exam:  Blood pressure 140/82, pulse (!) 112, temperature 36.7 °C (98 °F), temperature source Temporal, resp. rate 14, height 1.778 m (5' 10\"), weight 73.9 kg (163 lb), SpO2 98 %.  General:  Well nourished, well developed male in NAD  Head:Normocephalic, atraumatic  Eyes: PERRLA, EOM within normal limits, no conjunctival injection, no scleral icterus, visual fields and acuity grossly intact.  Ears: Normal shape and symmetry, no tenderness, no discharge. External canals are without any significant edema or erythema. Tympanic membranes are without any inflammation, no effusion. Gross auditory acuity is intact  Nose: Symmetrical without tenderness, no discharge.  Nasal mucosa on the right is erythematous and edematous with posterior nasal cavity exudate  Mouth: reasonable hygiene, no erythema exudates or tonsillar enlargement.  Neck: no masses, range of motion within normal limits, no tracheal deviation. No obvious thyroid enlargement.  Pulmonary: chest is symmetrical with respiration, no wheezes, crackles, or rhonchi.  Cardiovascular: regular rate and rhythm without murmurs, rubs, or gallops.  Extremities: no clubbing, cyanosis, or edema.    Please note that this dictation was created using voice recognition software. I have made every reasonable attempt to correct obvious errors, but I expect that there are errors of grammar and possibly content that I did not discover before finalizing the note.    Assessment/Plan:  1. Acute pansinusitis, recurrence not specified  amoxicillin-clavulanate (AUGMENTIN) 875-125 MG Tab   May continue over-the-counter decongestants as tolerated.    Followup with primary care in the next 7-10 days if not significantly improving, return to the urgent care or go to " the emergency room sooner for any worsening of symptoms.

## 2019-07-15 ENCOUNTER — NON-PROVIDER VISIT (OUTPATIENT)
Dept: URGENT CARE | Facility: PHYSICIAN GROUP | Age: 34
End: 2019-07-15

## 2019-07-15 DIAGNOSIS — Z02.1 PRE-EMPLOYMENT DRUG SCREENING: ICD-10-CM

## 2019-07-15 PROCEDURE — 7503 PR ESCREEN ACCT UDS COL ONLY: Performed by: PHYSICIAN ASSISTANT

## 2021-02-11 ENCOUNTER — OFFICE VISIT (OUTPATIENT)
Dept: MEDICAL GROUP | Facility: PHYSICIAN GROUP | Age: 36
End: 2021-02-11
Payer: COMMERCIAL

## 2021-02-11 VITALS
TEMPERATURE: 98.6 F | BODY MASS INDEX: 23.88 KG/M2 | OXYGEN SATURATION: 98 % | DIASTOLIC BLOOD PRESSURE: 88 MMHG | RESPIRATION RATE: 12 BRPM | SYSTOLIC BLOOD PRESSURE: 148 MMHG | HEART RATE: 86 BPM | HEIGHT: 70 IN | WEIGHT: 166.8 LBS

## 2021-02-11 DIAGNOSIS — Z13.6 SCREENING FOR CARDIOVASCULAR CONDITION: ICD-10-CM

## 2021-02-11 DIAGNOSIS — I10 ESSENTIAL HYPERTENSION: ICD-10-CM

## 2021-02-11 PROBLEM — J02.0 STREP PHARYNGITIS: Status: RESOLVED | Noted: 2018-10-19 | Resolved: 2021-02-11

## 2021-02-11 PROCEDURE — 99214 OFFICE O/P EST MOD 30 MIN: CPT | Performed by: NURSE PRACTITIONER

## 2021-02-11 RX ORDER — LISINOPRIL 10 MG/1
10 TABLET ORAL DAILY
Qty: 30 TABLET | Refills: 2 | Status: SHIPPED | OUTPATIENT
Start: 2021-02-11 | End: 2021-12-08

## 2021-02-11 ASSESSMENT — PATIENT HEALTH QUESTIONNAIRE - PHQ9: CLINICAL INTERPRETATION OF PHQ2 SCORE: 0

## 2021-02-11 NOTE — PROGRESS NOTES
"CC: Blood pressure problem    HISTORY OF THE PRESENT ILLNESS: Patient is a 35 y.o. male. This pleasant patient is here today for evaluation and management of the following health problems.  Patient has not been seen by me in clinic for 2 and half years.  He reports his health has been pretty good.  He now lives in [place] and works in "[employer]   with 3 children.        Essential hypertension  Patient reports blood pressure has been elevated in the past.  Has never been on medication.  He has not been monitoring his blood pressure recently.  He is concerned that his blood pressure may be too high for an upcoming physical for a new job.  He is wondering if he needs medications.    The patient denies chest pain, shortness of breath, headache, vision changes, epistaxis, or dyspnea on exertion.  He does not smoke, rarely drinks alcohol.  Exercises 4-5 times a week by riding stationary bike 4 miles.      Allergies: Patient has no known allergies.    Current Outpatient Medications Ordered in Epic   Medication Sig Dispense Refill   • lisinopril (PRINIVIL) 10 MG Tab Take 1 tablet by mouth every day. 30 tablet 2     No current Epic-ordered facility-administered medications on file.       No past medical history on file.    Past Surgical History:   Procedure Laterality Date   • ANKLE ORIF Right        Social History     Tobacco Use   • Smoking status: Former Smoker     Packs/day: 0.25     Years: 1.00     Pack years: 0.25     Types: Cigarettes     Quit date:      Years since quittin.1   • Smokeless tobacco: Never Used   Substance Use Topics   • Alcohol use: Yes     Alcohol/week: 0.6 oz     Types: 1 Cans of beer per week     Comment: socially    • Drug use: No       No family history on file.    ROS:   As in HPI, otherwise negative for chest pain, dyspnea, abdominal pain, dysuria, blood in stool, fever          Exam: /88   Pulse 86   Temp 37 °C (98.6 °F) (Temporal)   Resp 12   Ht 1.778 m (5' 10\")   " Wt 75.7 kg (166 lb 12.8 oz)   SpO2 98%  Body mass index is 23.93 kg/m².    General: Alert, pleasant, well nourished, well developed male in NAD  HEENT: Normocephalic. Eyes conjunctiva clear lids without ptosis, pupils equal and reactive to light, ears normal shape and contour, canals are clear bilaterally, tympanic membranes are pearly gray with good light reflex, nasal mucosa without erythema and drainage, oropharynx is without erythema, edema or exudates.   Neck: Supple without bruit. Thyroid is not enlarged.  Pulmonary: Clear to ausculation.  Normal effort. No rales, ronchi, or wheezing.  Cardiovascular: Normal rate and rhythm without murmur. Carotid and radial pulses are intact and equal bilaterally.  No lower extremity edema.  Abdomen: Soft, nontender, nondistended. Normal bowel sounds. Liver and spleen are not palpable  Neurologic: Grossly nonfocal  Lymph: No cervical or supraclavicular lymph nodes are palpable  Skin: Warm and dry.    Musculoskeletal: Normal gait.   Psych: Normal mood and affect. Alert and oriented. Judgment and insight is normal.    Please note that this dictation was created using voice recognition software. I have made every reasonable attempt to correct obvious errors, but I expect that there are errors of grammar and possibly content that I did not discover before finalizing the note.      Assessment/Plan  1. Essential hypertension  Blood pressure elevated on exam today.  Has been elevated at other appointments at [facility].  We discussed risks of target organ damage from hypertension.  Will start with lisinopril 10 mg daily.  Instructions and side effects reviewed with patient, including dry cough.  He will monitor his blood pressure at home and bring in readings to next appointment.  Continue with lifestyle measures.  We will get updated labs prior to next appointment.  - Comp Metabolic Panel; Future  - ESTIMATED GFR; Future  - Lipid Profile; Future  - lisinopril (PRINIVIL) 10 MG Tab;  Take 1 tablet by mouth every day.  Dispense: 30 tablet; Refill: 2    2. Screening for cardiovascular condition    - Lipid Profile; Future    Patient will return to clinic in 6 weeks for lab review and follow-up on hypertension or sooner if needed.

## 2021-02-11 NOTE — ASSESSMENT & PLAN NOTE
Patient reports blood pressure has been elevated in the past.  Has never been on medication.  He has not been monitoring his blood pressure recently.  He is concerned that his blood pressure may be too high for an upcoming physical for a new job.  He is wondering if he needs medications.    The patient denies chest pain, shortness of breath, headache, vision changes, epistaxis, or dyspnea on exertion.  He does not smoke, rarely drinks alcohol.  Exercises 4-5 times a week by riding stationary bike 4 miles.

## 2021-02-16 ENCOUNTER — NON-PROVIDER VISIT (OUTPATIENT)
Dept: MEDICAL GROUP | Facility: PHYSICIAN GROUP | Age: 36
End: 2021-02-16
Payer: COMMERCIAL

## 2021-02-16 VITALS — SYSTOLIC BLOOD PRESSURE: 138 MMHG | DIASTOLIC BLOOD PRESSURE: 80 MMHG

## 2021-02-16 NOTE — NON-PROVIDER
.Maurilio Andres is a 35 y.o. male here for a non-provider visit for blood pressure check   Compare his cuff with ours.     There were no vitals filed for this visit.  If abnormal, was an in office provider notified today? Yes  Routed to PCP? Yes        Recording as follows     10:20  144/91 on his auto machine left arm  Pulse 128  10:25   138/30 manual reading left arm     1039  168/96  Auto machine right arm  pluse 124  10:48  138/86 manual reading right arm  Pulse 105    10:50 165/104 auto machine left arm  Pulse 116

## 2021-08-27 ENCOUNTER — OFFICE VISIT (OUTPATIENT)
Dept: URGENT CARE | Facility: PHYSICIAN GROUP | Age: 36
End: 2021-08-27
Payer: COMMERCIAL

## 2021-08-27 ENCOUNTER — HOSPITAL ENCOUNTER (OUTPATIENT)
Facility: MEDICAL CENTER | Age: 36
End: 2021-08-27
Attending: PHYSICIAN ASSISTANT

## 2021-08-27 VITALS
HEART RATE: 100 BPM | SYSTOLIC BLOOD PRESSURE: 150 MMHG | OXYGEN SATURATION: 98 % | WEIGHT: 164 LBS | HEIGHT: 70 IN | BODY MASS INDEX: 23.48 KG/M2 | RESPIRATION RATE: 16 BRPM | DIASTOLIC BLOOD PRESSURE: 82 MMHG | TEMPERATURE: 98.2 F

## 2021-08-27 DIAGNOSIS — R68.89 FLU-LIKE SYMPTOMS: ICD-10-CM

## 2021-08-27 LAB — COVID ORDER STATUS COVID19: NORMAL

## 2021-08-27 PROCEDURE — U0005 INFEC AGEN DETEC AMPLI PROBE: HCPCS

## 2021-08-27 PROCEDURE — U0003 INFECTIOUS AGENT DETECTION BY NUCLEIC ACID (DNA OR RNA); SEVERE ACUTE RESPIRATORY SYNDROME CORONAVIRUS 2 (SARS-COV-2) (CORONAVIRUS DISEASE [COVID-19]), AMPLIFIED PROBE TECHNIQUE, MAKING USE OF HIGH THROUGHPUT TECHNOLOGIES AS DESCRIBED BY CMS-2020-01-R: HCPCS

## 2021-08-27 PROCEDURE — 99213 OFFICE O/P EST LOW 20 MIN: CPT | Performed by: PHYSICIAN ASSISTANT

## 2021-08-27 NOTE — LETTER
August 27, 2021         Patient: Maurilio Andres   YOB: 1985   Date of Visit: 8/27/2021           To Whom it May Concern:    Maurilio Andres was seen in my clinic on 8/27/2021.     Please excuse patient for missing school/work until COVID results are available, typically taking 1-3 days.  They have been advised to quarantine during this time.      If you have any questions or concerns, please don't hesitate to call.        Sincerely,           Dionne Sherman P.A.-C.  Electronically Signed

## 2021-08-27 NOTE — PROGRESS NOTES
Chief Complaint   Patient presents with   • Coronavirus Screening     x2-3days, headache, bodyaches, and fatigue (exposed)       HISTORY OF PRESENT ILLNESS: Patient is a 35 y.o. male who presents today for the following:    Cough x2 days  + Headache, body aches  Denies fever, shortness of breath, history of asthma  History of COVID infection: No  Known COVID contact: Yes  COVID vaccine: No    Patient Active Problem List    Diagnosis Date Noted   • Essential hypertension 2021   • Abnormal brain CT 10/19/2018   • Nonintractable headache 10/19/2018   • Epistaxis 10/19/2018       Allergies:Patient has no known allergies.    Current Outpatient Medications Ordered in Epic   Medication Sig Dispense Refill   • lisinopril (PRINIVIL) 10 MG Tab Take 1 tablet by mouth every day. (Patient not taking: Reported on 2021) 30 tablet 2     No current Epic-ordered facility-administered medications on file.       History reviewed. No pertinent past medical history.    Social History     Tobacco Use   • Smoking status: Former Smoker     Packs/day: 0.25     Years: 1.00     Pack years: 0.25     Types: Cigarettes     Quit date:      Years since quittin.6   • Smokeless tobacco: Never Used   Vaping Use   • Vaping Use: Never used   Substance Use Topics   • Alcohol use: Yes     Alcohol/week: 0.6 oz     Types: 1 Cans of beer per week     Comment: socially    • Drug use: No       Family Status   Relation Name Status   • Mo  Alive, age 58y   • Fa  Alive, age 67y   • Sis  Alive   • Bro  Alive   History reviewed. No pertinent family history.    Review of Systems:   Constitutional ROS: No unexpected change in weight  Pulmonary ROS: No chronic cough, sputum, or hemoptysis, No dyspnea on exertion, No wheezing  Cardiovascular ROS: No diaphoresis, No edema, No palpitations  Hematologic/Lymphatic ROS: Positive for chills  Skin/Integumentary ROS: No edema, No evidence of rash, No itching      Exam:  /82   Pulse 100   Temp 36.8 °C  "(98.2 °F) (Temporal)   Resp 16   Ht 1.778 m (5' 10\")   Wt 74.4 kg (164 lb)   SpO2 98%   General: Well developed, well nourished. No distress.    Eye: PERRL/EOMI; conjunctivae clear, lids normal.  ENMT: Lips without lesions, MMM. Oropharynx is clear. Bilateral TMs are within normal limits.  Pulmonary: Unlabored respiratory effort. Lungs clear to auscultation, no wheezes, no rhonchi.    Cardiovascular: Regular rate and rhythm without murmur.   Neurologic: Grossly nonfocal. No facial asymmetry noted.  Lymph: No cervical lymphadenopathy noted.  Skin: Warm, dry, good turgor. No rashes in visible areas.   Psych: Normal mood. Alert and oriented to person, place and time.    Assessment/Plan:  Discussed likely viral etiology.  Vitals and exam are unremarkable.  Low suspicion for pneumonia. Patient will be tested for COVID and has been advised to quarantine until results are available. Discussed appropriate over-the-counter symptomatic medication, and when to return to clinic. Follow up for worsening or persistent symptoms.  1. Flu-like symptoms  SARS-CoV-2, PCR (In-House): Collect NP OR nasal swab in [facility]       "

## 2021-08-28 LAB
SARS-COV-2 RNA RESP QL NAA+PROBE: DETECTED
SPECIMEN SOURCE: ABNORMAL

## 2021-12-08 ENCOUNTER — OFFICE VISIT (OUTPATIENT)
Dept: URGENT CARE | Facility: PHYSICIAN GROUP | Age: 36
End: 2021-12-08

## 2021-12-08 VITALS
TEMPERATURE: 98.9 F | BODY MASS INDEX: 24.05 KG/M2 | HEIGHT: 70 IN | WEIGHT: 168 LBS | DIASTOLIC BLOOD PRESSURE: 70 MMHG | SYSTOLIC BLOOD PRESSURE: 142 MMHG | OXYGEN SATURATION: 98 % | RESPIRATION RATE: 18 BRPM | HEART RATE: 113 BPM

## 2021-12-08 DIAGNOSIS — J02.9 SORE THROAT: ICD-10-CM

## 2021-12-08 DIAGNOSIS — R68.89 FLU-LIKE SYMPTOMS: ICD-10-CM

## 2021-12-08 LAB
INT CON NEG: NORMAL
INT CON POS: NORMAL
S PYO AG THROAT QL: NEGATIVE

## 2021-12-08 PROCEDURE — 87880 STREP A ASSAY W/OPTIC: CPT | Performed by: PHYSICIAN ASSISTANT

## 2021-12-08 PROCEDURE — 99213 OFFICE O/P EST LOW 20 MIN: CPT | Performed by: PHYSICIAN ASSISTANT

## 2021-12-09 NOTE — PROGRESS NOTES
"Chief Complaint   Patient presents with   • Pharyngitis     poss strep        HISTORY OF PRESENT ILLNESS: Patient is a 36 y.o. male who presents today for the following:    ST started 2 days ago  + cough, fever, chills, runny nose, nasal congestion, HA, body aches  Strep exposure    Patient Active Problem List    Diagnosis Date Noted   • Essential hypertension 2021   • Abnormal brain CT 10/19/2018   • Nonintractable headache 10/19/2018   • Epistaxis 10/19/2018       Allergies:Patient has no known allergies.    Current Outpatient Medications Ordered in Epic   Medication Sig Dispense Refill   • lisinopril (PRINIVIL) 10 MG Tab Take 1 tablet by mouth every day. (Patient not taking: Reported on 2021) 30 tablet 2     No current Epic-ordered facility-administered medications on file.       History reviewed. No pertinent past medical history.    Social History     Tobacco Use   • Smoking status: Former Smoker     Packs/day: 0.25     Years: 1.00     Pack years: 0.25     Types: Cigarettes     Quit date:      Years since quittin.9   • Smokeless tobacco: Never Used   Vaping Use   • Vaping Use: Never used   Substance Use Topics   • Alcohol use: Yes     Alcohol/week: 0.6 oz     Types: 1 Cans of beer per week     Comment: socially    • Drug use: No       Family Status   Relation Name Status   • Mo  Alive, age 59y   • Fa  Alive, age 68y   • Sis  Alive   • Bro  Alive   History reviewed. No pertinent family history.    Review of Systems:   Pertinent systems reviewed with the patient.      Exam:  /70   Pulse (!) 113   Temp 37.2 °C (98.9 °F)   Resp 18   Ht 1.778 m (5' 10\")   Wt 76.2 kg (168 lb)   SpO2 98%   General: Well developed, well nourished. No distress.    Eye: PERRL/EOMI; conjunctivae clear, lids normal.  ENMT: Lips without lesions, MMM. Oropharynx is clear. Bilateral TMs are within normal limits.  Pulmonary: Unlabored respiratory effort. Lungs clear to auscultation, no wheezes, no rhonchi.  "   Cardiovascular: Regular rate and rhythm without murmur.   Neurologic: Grossly nonfocal. No facial asymmetry noted.  Lymph: No cervical lymphadenopathy noted.  Skin: Warm, dry, good turgor. No rashes in visible areas.   Psych: Normal mood. Alert and oriented to person, place and time.    Rapid strep: Negative    Assessment/Plan:  Discussed likely viral etiology, including Covid.  Patient declines Covid testing.  No note for work was provided.  Vitals and exam are unremarkable.  Low suspicion for pneumonia.  Discussed appropriate over-the-counter symptomatic medication, and when to return to clinic. Follow up for worsening or persistent symptoms.  1. Flu-like symptoms     2. Sore throat  POCT Rapid Strep A

## 2022-12-16 ENCOUNTER — OFFICE VISIT (OUTPATIENT)
Dept: URGENT CARE | Facility: PHYSICIAN GROUP | Age: 37
End: 2022-12-16
Payer: COMMERCIAL

## 2022-12-16 VITALS
TEMPERATURE: 99.2 F | HEART RATE: 100 BPM | DIASTOLIC BLOOD PRESSURE: 80 MMHG | HEIGHT: 71 IN | WEIGHT: 168 LBS | SYSTOLIC BLOOD PRESSURE: 118 MMHG | BODY MASS INDEX: 23.52 KG/M2 | OXYGEN SATURATION: 97 % | RESPIRATION RATE: 16 BRPM

## 2022-12-16 DIAGNOSIS — J02.9 SORE THROAT: ICD-10-CM

## 2022-12-16 DIAGNOSIS — J06.9 VIRAL URI WITH COUGH: ICD-10-CM

## 2022-12-16 PROCEDURE — 99213 OFFICE O/P EST LOW 20 MIN: CPT | Performed by: NURSE PRACTITIONER

## 2022-12-16 ASSESSMENT — VISUAL ACUITY: OU: 1

## 2022-12-16 ASSESSMENT — ENCOUNTER SYMPTOMS
SORE THROAT: 1
COUGH: 1
FEVER: 0
CONSTITUTIONAL NEGATIVE: 1
SHORTNESS OF BREATH: 0

## 2022-12-16 NOTE — PROGRESS NOTES
"Subjective:     Maurilio Andres is a 37 y.o. male who presents for Pharyngitis (X 2 days , cough )       Pharyngitis   This is a new problem. The problem has been gradually worsening. Associated symptoms include coughing. Pertinent negatives include no congestion or shortness of breath.     Reports daughter has strep throat. Traveling to [facility] soon. Comes in for evaluation prior.    Review of Systems   Constitutional: Negative.  Negative for fever and malaise/fatigue.   HENT:  Positive for sore throat. Negative for congestion.    Respiratory:  Positive for cough. Negative for shortness of breath.    All other systems reviewed and are negative.    Refer to HPI for additional details.    During this visit, appropriate PPE was worn, hand hygiene was performed, and the patient and any visitors were masked.    PMH:  has no past medical history on file.    MEDS: No current outpatient medications on file.    ALLERGIES: No Known Allergies  SURGHX:   Past Surgical History:   Procedure Laterality Date    ORIF, ANKLE Right 2006     SOCHX:  reports that he quit smoking about 10 years ago. His smoking use included cigarettes. He has a 0.25 pack-year smoking history. He has never used smokeless tobacco. He reports current alcohol use of about 0.6 oz per week. He reports that he does not use drugs.    FH: Per HPI as applicable/pertinent.      Objective:     /80   Pulse 100   Temp 37.3 °C (99.2 °F) (Temporal)   Resp 16   Ht 1.803 m (5' 11\")   Wt 76.2 kg (168 lb)   SpO2 97%   BMI 23.43 kg/m²     Physical Exam  Nursing note reviewed.   Constitutional:       General: He is not in acute distress.     Appearance: He is well-developed. He is not ill-appearing or toxic-appearing.   HENT:      Head: Normocephalic.      Nose: Nose normal.      Mouth/Throat:      Mouth: Mucous membranes are moist.      Pharynx: Oropharynx is clear. Uvula midline. No oropharyngeal exudate or posterior oropharyngeal erythema.   Eyes:      " General: Vision grossly intact.      Extraocular Movements: Extraocular movements intact.      Conjunctiva/sclera: Conjunctivae normal.   Neck:      Trachea: Phonation normal.   Cardiovascular:      Rate and Rhythm: Normal rate and regular rhythm.      Pulses: Normal pulses.      Heart sounds: Normal heart sounds.   Pulmonary:      Effort: Pulmonary effort is normal. No respiratory distress.      Breath sounds: Normal breath sounds. No decreased breath sounds.   Abdominal:      General: Bowel sounds are normal.      Palpations: Abdomen is soft.      Tenderness: There is no abdominal tenderness.   Musculoskeletal:         General: No deformity. Normal range of motion.      Cervical back: Normal range of motion.   Lymphadenopathy:      Cervical: No cervical adenopathy.   Skin:     General: Skin is warm and dry.      Coloration: Skin is not pale.   Neurological:      Mental Status: He is alert and oriented to person, place, and time.      Motor: No weakness.   Psychiatric:         Behavior: Behavior normal. Behavior is cooperative.     Rapid Strep A swab: negative      Assessment/Plan:     1. Sore throat    2. Viral URI with cough    Negative rapid strep swab. Low Centor criteria. Vital signs stable, afebrile, no acute distress at this time.    Discussed likely self-limiting viral etiology and expected course and duration of illness.     Patient declines Covid and flu testing at this time.    Differential diagnosis, natural history, supportive care, rest, fluids, over-the-counter symptom management per 's instructions, ibuprofen, APAP, close monitoring, and indications for immediate follow-up discussed. Standard precautions.    Warning signs reviewed. Return precautions discussed.     All questions answered. Patient agrees with the plan of care.    [facility]

## 2023-06-13 ENCOUNTER — OFFICE VISIT (OUTPATIENT)
Dept: MEDICAL GROUP | Facility: PHYSICIAN GROUP | Age: 38
End: 2023-06-13
Payer: COMMERCIAL

## 2023-06-13 VITALS
TEMPERATURE: 98.6 F | BODY MASS INDEX: 25.87 KG/M2 | DIASTOLIC BLOOD PRESSURE: 98 MMHG | SYSTOLIC BLOOD PRESSURE: 148 MMHG | HEART RATE: 108 BPM | RESPIRATION RATE: 16 BRPM | HEIGHT: 71 IN | OXYGEN SATURATION: 97 % | WEIGHT: 184.8 LBS

## 2023-06-13 DIAGNOSIS — R53.83 FATIGUE, UNSPECIFIED TYPE: ICD-10-CM

## 2023-06-13 DIAGNOSIS — R04.0 EPISTAXIS: ICD-10-CM

## 2023-06-13 DIAGNOSIS — Z13.6 SCREENING FOR CARDIOVASCULAR CONDITION: ICD-10-CM

## 2023-06-13 DIAGNOSIS — I10 ESSENTIAL HYPERTENSION: ICD-10-CM

## 2023-06-13 PROCEDURE — 99214 OFFICE O/P EST MOD 30 MIN: CPT | Performed by: NURSE PRACTITIONER

## 2023-06-13 PROCEDURE — 3080F DIAST BP >= 90 MM HG: CPT | Performed by: NURSE PRACTITIONER

## 2023-06-13 PROCEDURE — 3077F SYST BP >= 140 MM HG: CPT | Performed by: NURSE PRACTITIONER

## 2023-06-13 ASSESSMENT — PATIENT HEALTH QUESTIONNAIRE - PHQ9: CLINICAL INTERPRETATION OF PHQ2 SCORE: 0

## 2023-06-13 NOTE — ASSESSMENT & PLAN NOTE
Blood pressure elevated on exam today.  Patient reports he has history of whitecoat syndrome though has not checked his blood pressure at home in quite a while.  Does not smoke or drink alcohol.  Exercising.  Weight in normal category.  The patient denies chest pain, shortness of breath, headache, vision changes, epistaxis, or dyspnea on exertion.

## 2023-06-13 NOTE — ASSESSMENT & PLAN NOTE
Patient reports general fatigue.  Feels during the day.  Sleeping about 8 hours a night.  Denies snoring.  Denies chest pain, shortness of breath.  Exercising 3 times a week at the gym.  Has very active job.  Wanting to get testosterone levels with lab panel.

## 2023-06-13 NOTE — ASSESSMENT & PLAN NOTE
Patient reports long history of epistaxis since childhood.  Sometimes would have difficulty stopping bloody nose.  Has tried saline irrigation, saline nasal spray without relief.  He started supplemental vitamin K approximately a year ago and no longer having bloody noses.  He wonders if this means he may have a bleeding disorder.

## 2023-06-13 NOTE — PROGRESS NOTES
CC: Want lab orders, bloody noses    HISTORY OF THE PRESENT ILLNESS: Patient is a 37 y.o. male. This pleasant patient is here today for evaluation and management of the following health problems.        Epistaxis  Patient reports long history of epistaxis since childhood.  Sometimes would have difficulty stopping bloody nose.  Has tried saline irrigation, saline nasal spray without relief.  He started supplemental vitamin K approximately a year ago and no longer having bloody noses.  He wonders if this means he may have a bleeding disorder.    Fatigue  Patient reports general fatigue.  Feels during the day.  Sleeping about 8 hours a night.  Denies snoring.  Denies chest pain, shortness of breath.  Exercising 3 times a week at the gym.  Has very active job.  Wanting to get testosterone levels with lab panel.    Essential hypertension  Blood pressure elevated on exam today.  Patient reports he has history of whitecoat syndrome though has not checked his blood pressure at home in quite a while.  Does not smoke or drink alcohol.  Exercising.  Weight in normal category.  The patient denies chest pain, shortness of breath, headache, vision changes, epistaxis, or dyspnea on exertion.      Allergies: Patient has no known allergies.    Current Outpatient Medications Ordered in Epic   Medication Sig Dispense Refill    vitamin k 100 MCG tablet Take 100 mcg by mouth every day. Taking 2 tabs daily      esomeprazole (NEXIUM) 20 MG capsule Take 20 mg by mouth every morning before breakfast. Taking 2 tablets daily       No current [facility]-ordered facility-administered medications on file.       History reviewed. No pertinent past medical history.    Past Surgical History:   Procedure Laterality Date    ORIF, ANKLE Right        Social History     Tobacco Use    Smoking status: Former     Packs/day: 0.25     Years: 1.00     Pack years: 0.25     Types: Cigarettes     Quit date:      Years since quittin.4    Smokeless  "tobacco: Never   Vaping Use    Vaping Use: Never used   Substance Use Topics    Alcohol use: Yes     Alcohol/week: 0.6 oz     Types: 1 Cans of beer per week     Comment: socially     Drug use: No       History reviewed. No pertinent family history.    ROS: As in HPI, otherwise negative for chest pain, dyspnea, abdominal pain, dysuria, blood in stool, fever         Exam: BP (!) 148/98   Pulse (!) 108   Temp 37 °C (98.6 °F) (Temporal)   Resp 16   Ht 1.803 m (5' 11\")   Wt 83.8 kg (184 lb 12.8 oz)   SpO2 97%  Body mass index is 25.77 kg/m².    General: Alert, pleasant, well nourished, well developed male in NAD  HEENT: Normocephalic. Eyes conjunctiva clear lids without ptosis, pupils equal and reactive to light, ears normal shape and contour, canals are clear bilaterally, tympanic membranes are pearly gray with good light reflex, nasal mucosa with mild erythema septal aspect of left nare, of right nare without erythema and drainage, oropharynx is without erythema, edema or exudates.   Neck: Supple without bruit. Thyroid is not enlarged.  Pulmonary: Clear to ausculation.  Normal effort. No rales, ronchi, or wheezing.  Cardiovascular: Normal rate and rhythm without murmur. Carotid and radial pulses are intact and equal bilaterally.  No lower extremity edema.  Abdomen: Soft, nontender, nondistended. Normal bowel sounds. Liver and spleen are not palpable  Neurologic: Grossly nonfocal  Lymph: No cervical or supraclavicular lymph nodes are palpable  Skin: Warm and dry.    Musculoskeletal: Normal gait.   Psych: Normal mood and affect. Alert and oriented. Judgment and insight is normal.    Please note that this dictation was created using voice recognition software. I have made every reasonable attempt to correct obvious errors, but I expect that there are errors of grammar and possibly content that I did not discover before finalizing the note.      Assessment/Plan  1. Fatigue, unspecified type  We will get updated labs. "  Consider referral for sleep study.  Treatment plan pending results.  - VITAMIN D,25 HYDROXY (DEFICIENCY); Future  - TSH WITH REFLEX TO FT4; Future  - CBC WITHOUT DIFFERENTIAL; Future  - Testosterone, Free & Total, Adult Male (w/SHBG); Future  - VITAMIN B12; Future  - FERRITIN; Future    2. Screening for cardiovascular condition    - Comp Metabolic Panel; Future  - ESTIMATED GFR; Future  - Lipid Profile; Future  - CBC WITHOUT DIFFERENTIAL; Future    3. Epistaxis  Patient ports improvement of epistaxis after starting vitamin K supplementation.  Get basic CBC and check vitamin K level to make sure he is not taking too much.  Consider E consult with hematology and or consult with ENT pending results.  - VITAMIN K; Future    4. Essential hypertension  Elevated on exam today.  Advised patient to monitor his blood pressure at home and write readings down.  He will bring in readings and blood pressure cuff to next appointment.  Consider starting on antihypertensive.  Continue with routine aerobic exercise as tolerated.      Patient will return to clinic in 4 weeks for lab review and follow-up on fatigue and hypertension.

## 2023-07-10 ENCOUNTER — HOSPITAL ENCOUNTER (OUTPATIENT)
Dept: LAB | Facility: MEDICAL CENTER | Age: 38
End: 2023-07-10
Attending: NURSE PRACTITIONER
Payer: COMMERCIAL

## 2023-07-10 DIAGNOSIS — R04.0 EPISTAXIS: ICD-10-CM

## 2023-07-10 DIAGNOSIS — Z13.6 SCREENING FOR CARDIOVASCULAR CONDITION: ICD-10-CM

## 2023-07-10 DIAGNOSIS — R53.83 FATIGUE, UNSPECIFIED TYPE: ICD-10-CM

## 2023-07-10 LAB
25(OH)D3 SERPL-MCNC: 32 NG/ML (ref 30–100)
ALBUMIN SERPL BCP-MCNC: 4.9 G/DL (ref 3.2–4.9)
ALBUMIN/GLOB SERPL: 2 G/DL
ALP SERPL-CCNC: 115 U/L (ref 30–99)
ALT SERPL-CCNC: 15 U/L (ref 2–50)
ANION GAP SERPL CALC-SCNC: 13 MMOL/L (ref 7–16)
AST SERPL-CCNC: 27 U/L (ref 12–45)
BILIRUB SERPL-MCNC: 0.6 MG/DL (ref 0.1–1.5)
BUN SERPL-MCNC: 15 MG/DL (ref 8–22)
CALCIUM ALBUM COR SERPL-MCNC: 9.1 MG/DL (ref 8.5–10.5)
CALCIUM SERPL-MCNC: 9.8 MG/DL (ref 8.5–10.5)
CHLORIDE SERPL-SCNC: 103 MMOL/L (ref 96–112)
CHOLEST SERPL-MCNC: 282 MG/DL (ref 100–199)
CO2 SERPL-SCNC: 25 MMOL/L (ref 20–33)
CREAT SERPL-MCNC: 1.11 MG/DL (ref 0.5–1.4)
ERYTHROCYTE [DISTWIDTH] IN BLOOD BY AUTOMATED COUNT: 39.2 FL (ref 35.9–50)
FASTING STATUS PATIENT QL REPORTED: NORMAL
FERRITIN SERPL-MCNC: 46.5 NG/ML (ref 22–322)
GFR SERPLBLD CREATININE-BSD FMLA CKD-EPI: 87 ML/MIN/1.73 M 2
GLOBULIN SER CALC-MCNC: 2.5 G/DL (ref 1.9–3.5)
GLUCOSE SERPL-MCNC: 104 MG/DL (ref 65–99)
HCT VFR BLD AUTO: 49.2 % (ref 42–52)
HDLC SERPL-MCNC: 41 MG/DL
HGB BLD-MCNC: 16.7 G/DL (ref 14–18)
LDLC SERPL CALC-MCNC: 193 MG/DL
MCH RBC QN AUTO: 29 PG (ref 27–33)
MCHC RBC AUTO-ENTMCNC: 33.9 G/DL (ref 32.3–36.5)
MCV RBC AUTO: 85.6 FL (ref 81.4–97.8)
PLATELET # BLD AUTO: 256 K/UL (ref 164–446)
PMV BLD AUTO: 9.1 FL (ref 9–12.9)
POTASSIUM SERPL-SCNC: 4.4 MMOL/L (ref 3.6–5.5)
PROT SERPL-MCNC: 7.4 G/DL (ref 6–8.2)
RBC # BLD AUTO: 5.75 M/UL (ref 4.7–6.1)
SODIUM SERPL-SCNC: 141 MMOL/L (ref 135–145)
TRIGL SERPL-MCNC: 238 MG/DL (ref 0–149)
TSH SERPL DL<=0.005 MIU/L-ACNC: 1.79 UIU/ML (ref 0.38–5.33)
VIT B12 SERPL-MCNC: 1072 PG/ML (ref 211–911)
WBC # BLD AUTO: 7.2 K/UL (ref 4.8–10.8)

## 2023-07-10 PROCEDURE — 84403 ASSAY OF TOTAL TESTOSTERONE: CPT

## 2023-07-10 PROCEDURE — 80061 LIPID PANEL: CPT

## 2023-07-10 PROCEDURE — 84402 ASSAY OF FREE TESTOSTERONE: CPT

## 2023-07-10 PROCEDURE — 84443 ASSAY THYROID STIM HORMONE: CPT

## 2023-07-10 PROCEDURE — 84270 ASSAY OF SEX HORMONE GLOBUL: CPT

## 2023-07-10 PROCEDURE — 84597 ASSAY OF VITAMIN K: CPT

## 2023-07-10 PROCEDURE — 82728 ASSAY OF FERRITIN: CPT

## 2023-07-10 PROCEDURE — 85027 COMPLETE CBC AUTOMATED: CPT

## 2023-07-10 PROCEDURE — 82607 VITAMIN B-12: CPT

## 2023-07-10 PROCEDURE — 82306 VITAMIN D 25 HYDROXY: CPT

## 2023-07-10 PROCEDURE — 80053 COMPREHEN METABOLIC PANEL: CPT

## 2023-07-10 PROCEDURE — 36415 COLL VENOUS BLD VENIPUNCTURE: CPT

## 2023-07-12 LAB
SHBG SERPL-SCNC: 30 NMOL/L (ref 17–56)
TESTOST FREE MFR SERPL: 2 % (ref 1.6–2.9)
TESTOST FREE SERPL-MCNC: 103 PG/ML (ref 47–244)
TESTOST SERPL-MCNC: 517 NG/DL (ref 300–1080)

## 2023-07-13 ENCOUNTER — OFFICE VISIT (OUTPATIENT)
Dept: MEDICAL GROUP | Facility: PHYSICIAN GROUP | Age: 38
End: 2023-07-13
Payer: COMMERCIAL

## 2023-07-13 VITALS
TEMPERATURE: 98.1 F | SYSTOLIC BLOOD PRESSURE: 148 MMHG | DIASTOLIC BLOOD PRESSURE: 92 MMHG | HEIGHT: 71 IN | BODY MASS INDEX: 26.25 KG/M2 | HEART RATE: 83 BPM | RESPIRATION RATE: 16 BRPM | OXYGEN SATURATION: 100 % | WEIGHT: 187.5 LBS

## 2023-07-13 DIAGNOSIS — E78.5 DYSLIPIDEMIA: ICD-10-CM

## 2023-07-13 DIAGNOSIS — I10 ESSENTIAL HYPERTENSION: ICD-10-CM

## 2023-07-13 DIAGNOSIS — R53.83 FATIGUE, UNSPECIFIED TYPE: ICD-10-CM

## 2023-07-13 PROCEDURE — 3077F SYST BP >= 140 MM HG: CPT | Performed by: NURSE PRACTITIONER

## 2023-07-13 PROCEDURE — 99214 OFFICE O/P EST MOD 30 MIN: CPT | Performed by: NURSE PRACTITIONER

## 2023-07-13 PROCEDURE — 3080F DIAST BP >= 90 MM HG: CPT | Performed by: NURSE PRACTITIONER

## 2023-07-13 RX ORDER — LISINOPRIL 10 MG/1
10 TABLET ORAL DAILY
Qty: 90 TABLET | Refills: 3 | Status: SHIPPED | OUTPATIENT
Start: 2023-07-13

## 2023-07-13 ASSESSMENT — FIBROSIS 4 INDEX: FIB4 SCORE: 1.03

## 2023-07-13 NOTE — ASSESSMENT & PLAN NOTE
HPI 6/13/23:  Patient reports general fatigue.  Feels during the day.  Sleeping about 8 hours a night.  Denies snoring.  Denies chest pain, shortness of breath.  Exercising 3 times a week at the gym.  Has very active job.  Wanting to get testosterone levels with lab panel.    Today's HPI: Continued generalized fatigue.  Relieved that no abnormalities on lab results.  Sleeps about 8 hours a night, does not snore.  He attributes fatigue due to very busy lifestyle, working overtime and raising 3 kids, .

## 2023-07-13 NOTE — ASSESSMENT & PLAN NOTE
New finding.  Cholesterol and LDL quite elevated.  Denies family history of hyperlipidemia or heart disease.  Does eat fast food often for lunch, otherwise tries to eat a healthy diet.  Weight in normal category, active with work.  Goes to the gym.    Component      Latest Ref Rng 7/10/2023   Cholesterol,Tot      100 - 199 mg/dL 282 (H)    Triglycerides      0 - 149 mg/dL 238 (H)    HDL      >=40 mg/dL 41    LDL      <100 mg/dL 193 (H)

## 2023-07-13 NOTE — ASSESSMENT & PLAN NOTE
Blood pressure continues to be elevated.  Brings in home blood pressure cuff for comparison.  Home cuff reading 20 points higher systolic and diastolic than manual reading.  Home blood pressure readings in the 130s/90s.  Blood pressure today on exam is 148/92.  Does have a history of epistaxis.  The patient denies chest pain, shortness of breath, headache, vision changes, epistaxis, or dyspnea on exertion.

## 2023-07-13 NOTE — PROGRESS NOTES
CC: Lab review    HISTORY OF THE PRESENT ILLNESS: Patient is a 37 y.o. male. This pleasant patient is here today for evaluation and management of the following health problems.        Essential hypertension  Blood pressure continues to be elevated.  Brings in home blood pressure cuff for comparison.  Home cuff reading 20 points higher systolic and diastolic than manual reading.  Home blood pressure readings in the 130s/90s.  Blood pressure today on exam is 148/92.  Does have a history of epistaxis.  The patient denies chest pain, shortness of breath, headache, vision changes, epistaxis, or dyspnea on exertion.      Fatigue  HPI 6/13/23:  Patient reports general fatigue.  Feels during the day.  Sleeping about 8 hours a night.  Denies snoring.  Denies chest pain, shortness of breath.  Exercising 3 times a week at the gym.  Has very active job.  Wanting to get testosterone levels with lab panel.    Today's HPI: Continued generalized fatigue.  Relieved that no abnormalities on lab results.  Sleeps about 8 hours a night, does not snore.  He attributes fatigue due to very busy lifestyle, working overtime and raising 3 kids, .    Dyslipidemia  New finding.  Cholesterol and LDL quite elevated.  Denies family history of hyperlipidemia or heart disease.  Does eat fast food often for lunch, otherwise tries to eat a healthy diet.  Weight in normal category, active with work.  Goes to the gym.    Component      Latest Ref Rng 7/10/2023   Cholesterol,Tot      100 - 199 mg/dL 282 (H)    Triglycerides      0 - 149 mg/dL 238 (H)    HDL      >=40 mg/dL 41    LDL      <100 mg/dL 193 (H)             Allergies: Patient has no known allergies.    Current Outpatient Medications Ordered in Epic   Medication Sig Dispense Refill    lisinopril (PRINIVIL) 10 MG Tab Take 1 Tablet by mouth every day. 90 Tablet 3    vitamin k 100 MCG tablet Take 100 mcg by mouth every day. Taking 2 tabs daily      esomeprazole (NEXIUM) 20 MG capsule Take  "20 mg by mouth every morning before breakfast. Taking 2 tablets daily       No current Psychiatric-ordered facility-administered medications on file.       History reviewed. No pertinent past medical history.    Past Surgical History:   Procedure Laterality Date    ORIF, ANKLE Right        Social History     Tobacco Use    Smoking status: Former     Packs/day: 0.25     Years: 1.00     Pack years: 0.25     Types: Cigarettes     Quit date:      Years since quittin.5    Smokeless tobacco: Never   Vaping Use    Vaping Use: Never used   Substance Use Topics    Alcohol use: Yes     Alcohol/week: 0.6 oz     Types: 1 Cans of beer per week     Comment: socially     Drug use: No       History reviewed. No pertinent family history.    ROS: As in HPI, otherwise negative for chest pain, dyspnea, abdominal pain, dysuria, blood in stool, fever           Exam: BP (!) 148/92   Pulse 83   Temp 36.7 °C (98.1 °F) (Temporal)   Resp 16   Ht 1.803 m (5' 11\")   Wt 85 kg (187 lb 8 oz)   SpO2 100%  Body mass index is 26.15 kg/m².    General: Alert, pleasant, well nourished, well developed male in NAD  HEENT: Normocephalic. Eyes conjunctiva clear lids without ptosis  Neck: Supple without bruit.   Pulmonary: Clear to ausculation.  Normal effort. No rales, ronchi, or wheezing.  Cardiovascular: Normal rate and rhythm without murmur.   Neurologic: Grossly nonfocal  Skin: Warm and dry.  Musculoskeletal: Normal gait.   Psych: Normal mood and affect. Alert and oriented. Judgment and insight is normal.    Please note that this dictation was created using voice recognition software. I have made every reasonable attempt to correct obvious errors, but I expect that there are errors of grammar and possibly content that I did not discover before finalizing the note.      Assessment/Plan  1. Essential hypertension  We will start low-dose lisinopril.  Instructions and potential side effects reviewed with patient.  Continue with exercise.  " Discussed DASH diet, handout given to patient today.  - lisinopril (PRINIVIL) 10 MG Tab; Take 1 Tablet by mouth every day.  Dispense: 90 Tablet; Refill: 3  - Referral to establish with [facility]    2. Fatigue, unspecified type  No lab indications to show fatigue.  Likely due to busy schedule.  Advised patient to find some time for himself, continue with 8 hours of sleep nightly.    3. Dyslipidemia  New finding.  Advised patient that statin is recommended.  However, can work on adjusting lifestyle measures such as healthier lunch.  Advised on rechecking in 6 months with new PCP.  Discussed with patient that he may have familial hypercholesterolemia and that his lifestyle measures may not help.  If that is the case, we will definitely start on statin.      I explained to patient today that I will be leaving the practice.  Offered follow-up appointment as new to you patient with another provider in [place].

## 2023-07-18 ENCOUNTER — PATIENT MESSAGE (OUTPATIENT)
Dept: MEDICAL GROUP | Facility: PHYSICIAN GROUP | Age: 38
End: 2023-07-18
Payer: COMMERCIAL

## 2023-07-18 DIAGNOSIS — E56.9 VITAMIN DEFICIENCY: ICD-10-CM

## 2023-07-18 LAB — PHYTONADIONE SERPL-MCNC: NORMAL NMOL/L (ref 0.22–4.88)

## 2023-08-01 ENCOUNTER — TELEPHONE (OUTPATIENT)
Dept: HEALTH INFORMATION MANAGEMENT | Facility: OTHER | Age: 38
End: 2023-08-01
Payer: COMMERCIAL

## 2023-08-15 SDOH — HEALTH STABILITY: PHYSICAL HEALTH: ON AVERAGE, HOW MANY MINUTES DO YOU ENGAGE IN EXERCISE AT THIS LEVEL?: 60 MIN

## 2023-08-15 SDOH — ECONOMIC STABILITY: INCOME INSECURITY: HOW HARD IS IT FOR YOU TO PAY FOR THE VERY BASICS LIKE FOOD, HOUSING, MEDICAL CARE, AND HEATING?: HARD

## 2023-08-15 SDOH — ECONOMIC STABILITY: TRANSPORTATION INSECURITY
IN THE PAST 12 MONTHS, HAS LACK OF TRANSPORTATION KEPT YOU FROM MEETINGS, WORK, OR FROM GETTING THINGS NEEDED FOR DAILY LIVING?: NO

## 2023-08-15 SDOH — ECONOMIC STABILITY: HOUSING INSECURITY
IN THE LAST 12 MONTHS, WAS THERE A TIME WHEN YOU DID NOT HAVE A STEADY PLACE TO SLEEP OR SLEPT IN A SHELTER (INCLUDING NOW)?: NO

## 2023-08-15 SDOH — HEALTH STABILITY: PHYSICAL HEALTH: ON AVERAGE, HOW MANY DAYS PER WEEK DO YOU ENGAGE IN MODERATE TO STRENUOUS EXERCISE (LIKE A BRISK WALK)?: 7 DAYS

## 2023-08-15 SDOH — HEALTH STABILITY: MENTAL HEALTH
STRESS IS WHEN SOMEONE FEELS TENSE, NERVOUS, ANXIOUS, OR CAN'T SLEEP AT NIGHT BECAUSE THEIR MIND IS TROUBLED. HOW STRESSED ARE YOU?: TO SOME EXTENT

## 2023-08-15 SDOH — ECONOMIC STABILITY: FOOD INSECURITY: WITHIN THE PAST 12 MONTHS, YOU WORRIED THAT YOUR FOOD WOULD RUN OUT BEFORE YOU GOT MONEY TO BUY MORE.: NEVER TRUE

## 2023-08-15 SDOH — ECONOMIC STABILITY: INCOME INSECURITY: IN THE LAST 12 MONTHS, WAS THERE A TIME WHEN YOU WERE NOT ABLE TO PAY THE MORTGAGE OR RENT ON TIME?: NO

## 2023-08-15 SDOH — ECONOMIC STABILITY: HOUSING INSECURITY: IN THE LAST 12 MONTHS, HOW MANY PLACES HAVE YOU LIVED?: 2

## 2023-08-15 SDOH — ECONOMIC STABILITY: FOOD INSECURITY: WITHIN THE PAST 12 MONTHS, THE FOOD YOU BOUGHT JUST DIDN'T LAST AND YOU DIDN'T HAVE MONEY TO GET MORE.: NEVER TRUE

## 2023-08-15 SDOH — ECONOMIC STABILITY: TRANSPORTATION INSECURITY
IN THE PAST 12 MONTHS, HAS THE LACK OF TRANSPORTATION KEPT YOU FROM MEDICAL APPOINTMENTS OR FROM GETTING MEDICATIONS?: NO

## 2023-08-15 SDOH — ECONOMIC STABILITY: TRANSPORTATION INSECURITY
IN THE PAST 12 MONTHS, HAS LACK OF RELIABLE TRANSPORTATION KEPT YOU FROM MEDICAL APPOINTMENTS, MEETINGS, WORK OR FROM GETTING THINGS NEEDED FOR DAILY LIVING?: NO

## 2023-08-15 ASSESSMENT — SOCIAL DETERMINANTS OF HEALTH (SDOH)
HOW OFTEN DO YOU ATTEND CHURCH OR RELIGIOUS SERVICES?: NEVER
DO YOU BELONG TO ANY CLUBS OR ORGANIZATIONS SUCH AS CHURCH GROUPS UNIONS, FRATERNAL OR ATHLETIC GROUPS, OR SCHOOL GROUPS?: NO
ARE YOU MARRIED, WIDOWED, DIVORCED, SEPARATED, NEVER MARRIED, OR LIVING WITH A PARTNER?: LIVING WITH PARTNER
ARE YOU MARRIED, WIDOWED, DIVORCED, SEPARATED, NEVER MARRIED, OR LIVING WITH A PARTNER?: LIVING WITH PARTNER
WITHIN THE PAST 12 MONTHS, YOU WORRIED THAT YOUR FOOD WOULD RUN OUT BEFORE YOU GOT THE MONEY TO BUY MORE: NEVER TRUE
HOW OFTEN DO YOU HAVE SIX OR MORE DRINKS ON ONE OCCASION: LESS THAN MONTHLY
HOW MANY DRINKS CONTAINING ALCOHOL DO YOU HAVE ON A TYPICAL DAY WHEN YOU ARE DRINKING: 3 OR 4
HOW OFTEN DO YOU ATTENT MEETINGS OF THE CLUB OR ORGANIZATION YOU BELONG TO?: NEVER
HOW OFTEN DO YOU ATTENT MEETINGS OF THE CLUB OR ORGANIZATION YOU BELONG TO?: NEVER
IN A TYPICAL WEEK, HOW MANY TIMES DO YOU TALK ON THE PHONE WITH FAMILY, FRIENDS, OR NEIGHBORS?: ONCE A WEEK
HOW OFTEN DO YOU HAVE A DRINK CONTAINING ALCOHOL: 2-4 TIMES A MONTH
HOW HARD IS IT FOR YOU TO PAY FOR THE VERY BASICS LIKE FOOD, HOUSING, MEDICAL CARE, AND HEATING?: HARD
HOW OFTEN DO YOU ATTEND CHURCH OR RELIGIOUS SERVICES?: NEVER
HOW OFTEN DO YOU GET TOGETHER WITH FRIENDS OR RELATIVES?: PATIENT DECLINED
DO YOU BELONG TO ANY CLUBS OR ORGANIZATIONS SUCH AS CHURCH GROUPS UNIONS, FRATERNAL OR ATHLETIC GROUPS, OR SCHOOL GROUPS?: NO
IN A TYPICAL WEEK, HOW MANY TIMES DO YOU TALK ON THE PHONE WITH FAMILY, FRIENDS, OR NEIGHBORS?: ONCE A WEEK
HOW OFTEN DO YOU GET TOGETHER WITH FRIENDS OR RELATIVES?: PATIENT DECLINED

## 2023-08-15 ASSESSMENT — LIFESTYLE VARIABLES
HOW OFTEN DO YOU HAVE SIX OR MORE DRINKS ON ONE OCCASION: LESS THAN MONTHLY
HOW OFTEN DO YOU HAVE A DRINK CONTAINING ALCOHOL: 2-4 TIMES A MONTH
SKIP TO QUESTIONS 9-10: 0
HOW MANY STANDARD DRINKS CONTAINING ALCOHOL DO YOU HAVE ON A TYPICAL DAY: 3 OR 4
AUDIT-C TOTAL SCORE: 4

## 2023-08-16 ENCOUNTER — OFFICE VISIT (OUTPATIENT)
Dept: MEDICAL GROUP | Facility: PHYSICIAN GROUP | Age: 38
End: 2023-08-16
Attending: NURSE PRACTITIONER
Payer: COMMERCIAL

## 2023-08-16 VITALS
OXYGEN SATURATION: 97 % | RESPIRATION RATE: 16 BRPM | DIASTOLIC BLOOD PRESSURE: 78 MMHG | HEART RATE: 89 BPM | TEMPERATURE: 96.6 F | WEIGHT: 185.63 LBS | HEIGHT: 71 IN | BODY MASS INDEX: 25.99 KG/M2 | SYSTOLIC BLOOD PRESSURE: 130 MMHG

## 2023-08-16 DIAGNOSIS — E78.5 DYSLIPIDEMIA: ICD-10-CM

## 2023-08-16 DIAGNOSIS — Z23 NEED FOR VACCINATION: ICD-10-CM

## 2023-08-16 DIAGNOSIS — I10 ESSENTIAL HYPERTENSION: ICD-10-CM

## 2023-08-16 DIAGNOSIS — R04.0 EPISTAXIS: ICD-10-CM

## 2023-08-16 DIAGNOSIS — K21.9 GASTROESOPHAGEAL REFLUX DISEASE, UNSPECIFIED WHETHER ESOPHAGITIS PRESENT: ICD-10-CM

## 2023-08-16 PROBLEM — R51.9 NONINTRACTABLE HEADACHE: Status: RESOLVED | Noted: 2018-10-19 | Resolved: 2023-08-16

## 2023-08-16 PROBLEM — R53.83 FATIGUE: Status: RESOLVED | Noted: 2023-06-13 | Resolved: 2023-08-16

## 2023-08-16 PROCEDURE — 99213 OFFICE O/P EST LOW 20 MIN: CPT | Performed by: STUDENT IN AN ORGANIZED HEALTH CARE EDUCATION/TRAINING PROGRAM

## 2023-08-16 PROCEDURE — 3075F SYST BP GE 130 - 139MM HG: CPT | Performed by: STUDENT IN AN ORGANIZED HEALTH CARE EDUCATION/TRAINING PROGRAM

## 2023-08-16 PROCEDURE — 3078F DIAST BP <80 MM HG: CPT | Performed by: STUDENT IN AN ORGANIZED HEALTH CARE EDUCATION/TRAINING PROGRAM

## 2023-08-16 ASSESSMENT — ENCOUNTER SYMPTOMS
FEVER: 0
CHILLS: 0
ABDOMINAL PAIN: 0
WHEEZING: 0
DIZZINESS: 0
PALPITATIONS: 0
ORTHOPNEA: 0
NAUSEA: 0
FOCAL WEAKNESS: 0
VOMITING: 0
COUGH: 0
HEADACHES: 0
BLURRED VISION: 0
SHORTNESS OF BREATH: 0

## 2023-08-16 ASSESSMENT — FIBROSIS 4 INDEX: FIB4 SCORE: 1.03

## 2023-08-16 NOTE — PROGRESS NOTES
Subjective:   HISTORY OF THE PRESENT ILLNESS: Patient is a 37 y.o. male here today to establish care. His/her prior PCP was Adriana Nava    Problem   Acid Reflux    Controlled with nexium twice a day     Dyslipidemia   Essential Hypertension    Takes lisinopril 10 mg daily started about 3 weeks ago  Has not checked home pressures since on the medication. No side effects.      Epistaxis   Fatigue (Resolved)   Nonintractable Headache (Resolved)        Current Outpatient Medications Ordered in Epic   Medication Sig Dispense Refill    lisinopril (PRINIVIL) 10 MG Tab Take 1 Tablet by mouth every day. 90 Tablet 3    vitamin k 100 MCG tablet Take 100 mcg by mouth every day. Taking 2 tabs daily      esomeprazole (NEXIUM) 20 MG capsule Take 20 mg by mouth every morning before breakfast. Taking 2 tablets daily       No current [facility]-ordered facility-administered medications on file.       Review of systems:  Review of Systems   Constitutional:  Negative for chills and fever.   Eyes:  Negative for blurred vision.   Respiratory:  Negative for cough, shortness of breath and wheezing.    Cardiovascular:  Negative for chest pain, palpitations, orthopnea and leg swelling.   Gastrointestinal:  Negative for abdominal pain, melena, nausea and vomiting.   Genitourinary:  Negative for dysuria.   Musculoskeletal:  Negative for joint pain.   Neurological:  Negative for dizziness, focal weakness and headaches.         Past Medical History:   Diagnosis Date    Anxiety     Hyperlipidemia      Past Surgical History:   Procedure Laterality Date    ORIF, ANKLE Right 2006    ARTHROPLASTY      EYE SURGERY       Social History     Tobacco Use    Smoking status: Former     Packs/day: 0.25     Years: 1.00     Additional pack years: 0.00     Total pack years: 0.25     Types: Cigarettes     Quit date:      Years since quittin.6    Smokeless tobacco: Never   Vaping Use    Vaping Use: Never used   Substance Use Topics    Alcohol use: Yes  "    Comment: socially     Drug use: No      History reviewed. No pertinent family history.  Current Outpatient Medications   Medication Sig Dispense Refill    lisinopril (PRINIVIL) 10 MG Tab Take 1 Tablet by mouth every day. 90 Tablet 3    vitamin k 100 MCG tablet Take 100 mcg by mouth every day. Taking 2 tabs daily      esomeprazole (NEXIUM) 20 MG capsule Take 20 mg by mouth every morning before breakfast. Taking 2 tablets daily       No current facility-administered medications for this visit.       Allergies:  No Known Allergies    Allergies, past medical history, past surgical history, family history, social history reviewed and updated.    Objective:    /78 (BP Location: Left arm, Patient Position: Sitting, BP Cuff Size: Adult)   Pulse 89   Temp 35.9 °C (96.6 °F) (Temporal)   Resp 16   Ht 1.803 m (5' 11\")   Wt 84.2 kg (185 lb 10 oz)   SpO2 97%   BMI 25.89 kg/m²    Body mass index is 25.89 kg/m².    Physical exam:  General: Normal appearance, no acute distress, not ill-appearing  HEENT: EOM intact, conjunctiva normal limits, negative right/left eye discharge.  Sclera anicteric  Cardiovascular: Normal rate and rhythm, no murmurs  Pulmonary: No respiratory distress, no wheezing, no rales, breath sounds normal.  Musculoskeletal: No edema bilaterally  Skin: Warm, dry, no lesions  Neurological: No focal deficits, normal gait  Psychiatric: Mood within normal limits    Assessment/Plan:    Patient here for a preventive medicine visit today and to establish care.  -Reviewed all past medical history, family history, social history    -Diet and exercise appropriate counseling given  -Social determinants of health reviewed  -Tobacco, alcohol, recreational drug use: Reviewed no concerns  -Occupation: [employer]  -Cholesterol screening: Likely familial hypercholesterolemia, was reviewed LDL from July 2023 shows     Immunizations/Infectious disease:  STI screening: Declined  Safe sex practices discusssed  HIV " screening: Declined  Immunizations: Tdap today    Cancer screenings:  Colorectal cancer screening: No family history        Problem List Items Addressed This Visit       Epistaxis     Patient reports a chronic history of nosebleeds.  Reports he takes a vitamin K supplement which resolves the issue.  If he were to stop the nosebleeds return.  He has a vitamin K lab pending, will order a PT, PTT.  Consider ENT referral however symptoms are well controlled with vitamin K and will continue this.  No family history of any hemophilia disorders.         Relevant Orders    Prothrombin Time    APTT    Essential hypertension     Continue lisinopril 10 mg daily.  Chronic, stable condition         Dyslipidemia     Likely familial hypercholesterolemia, .  He is 37 years old, no significant family history of heart disease, no other risk factors.     Will bring him back to consider treatment options         Acid reflux     Other Visit Diagnoses       Need for vaccination        Relevant Orders    Tdap Vaccine =>8YO IM             Return in about 3 months (around 11/16/2023) for cholesterol.

## 2023-08-16 NOTE — ASSESSMENT & PLAN NOTE
Likely familial hypercholesterolemia, .  He is 37 years old, no significant family history of heart disease, no other risk factors.     Will bring him back to consider treatment options

## 2023-08-16 NOTE — ASSESSMENT & PLAN NOTE
Patient reports a chronic history of nosebleeds.  Reports he takes a vitamin K supplement which resolves the issue.  If he were to stop the nosebleeds return.  He has a vitamin K lab pending, will order a PT, PTT.  Consider ENT referral however symptoms are well controlled with vitamin K and will continue this.  No family history of any hemophilia disorders.

## 2024-01-02 ENCOUNTER — OCCUPATIONAL MEDICINE (OUTPATIENT)
Dept: URGENT CARE | Facility: PHYSICIAN GROUP | Age: 39
End: 2024-01-02
Payer: COMMERCIAL

## 2024-01-02 ENCOUNTER — OFFICE VISIT (OUTPATIENT)
Dept: URGENT CARE | Facility: PHYSICIAN GROUP | Age: 39
End: 2024-01-02
Payer: COMMERCIAL

## 2024-01-02 VITALS
SYSTOLIC BLOOD PRESSURE: 136 MMHG | RESPIRATION RATE: 16 BRPM | DIASTOLIC BLOOD PRESSURE: 72 MMHG | HEART RATE: 105 BPM | BODY MASS INDEX: 25.48 KG/M2 | WEIGHT: 182 LBS | HEIGHT: 71 IN | TEMPERATURE: 97.6 F | OXYGEN SATURATION: 99 %

## 2024-01-02 DIAGNOSIS — S50.862A INSECT BITE OF LEFT FOREARM, INITIAL ENCOUNTER: ICD-10-CM

## 2024-01-02 DIAGNOSIS — L03.114 CELLULITIS OF LEFT FOREARM: ICD-10-CM
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
## 2024-01-02 DIAGNOSIS — I89.1 LYMPHANGITIS: ICD-10-CM

## 2024-01-02 PROCEDURE — 90715 TDAP VACCINE 7 YRS/> IM: CPT | Performed by: NURSE PRACTITIONER

## 2024-01-02 PROCEDURE — 99213 OFFICE O/P EST LOW 20 MIN: CPT | Mod: 25 | Performed by: NURSE PRACTITIONER

## 2024-01-02 PROCEDURE — 90471 IMMUNIZATION ADMIN: CPT | Performed by: NURSE PRACTITIONER

## 2024-01-02 PROCEDURE — 1126F AMNT PAIN NOTED NONE PRSNT: CPT | Performed by: NURSE PRACTITIONER

## 2024-01-02 RX ORDER — DOXYCYCLINE HYCLATE 100 MG
100 TABLET ORAL 2 TIMES DAILY
Qty: 14 TABLET | Refills: 0 | Status: SHIPPED | OUTPATIENT
Start: 2024-01-02 | End: 2024-01-04

## 2024-01-02 ASSESSMENT — ENCOUNTER SYMPTOMS
CHILLS: 0
FEVER: 0
VOMITING: 0
NAUSEA: 0

## 2024-01-02 ASSESSMENT — PAIN SCALES - GENERAL: PAINLEVEL: NO PAIN

## 2024-01-02 ASSESSMENT — FIBROSIS 4 INDEX: FIB4 SCORE: 1.06

## 2024-01-02 NOTE — LETTER
[facility]  [address] [place] [address] [place] [address]  Phone:  [phone] - Fax:  [phone]   [facility] Progress Report and Disability Certification  Date of Service: 1/2/2024   No Show:  No  Date / Time of Next Visit:     Claim Information   Patient Name: Maurilio Andres  Claim Number:     Employer: [employer]  Date of Injury: 1/1/2024     Insurer / TPA: [employer] ID / SSN:     Occupation: [employer]   Diagnosis: Diagnoses of Lymphangitis, Cellulitis of left forearm, and Insect bite of left forearm, initial encounter were pertinent to this visit.    Medical Information   Related to Industrial Injury? Yes    Subjective Complaints:  DOI 1/1/2024.  Pt presents for evaluation of a new problem.  Maurilio is a very pleasant 38-year-old male presents to urgent care today after sustaining a bite to his left forearm yesterday at work.  He states that he initially noticed a small bump on his left forearm that initially was itchy.  He initially thought nothing of this and went back to work as usual.  When he got home from work he did apply topical Benadryl cream.  This morning at work he developed worsening pruritus and pain of his left forearm.  When he lifted his he noticed redness and swelling surrounding this bite wound.  He has subsequently developed a red line up his left arm.  There has been no fever, chills, nausea or vomiting.  He did notify his workplace who probably provided hydrocortisone cream.  His symptoms have progressively worsened.   Objective Findings:  Comments: There is a 2 x 3 skin lesion to left forearm with surrounding erythema and lymphangitis up to mid bicep.  Area of erythema was outlined with skin marker.      Pre-Existing Condition(s):     Assessment:   Initial Visit    Status: Additional Care Required  Permanent Disability:No    Plan: Medication    Diagnostics:      Comments:       Disability Information   Status: Released to Full Duty     From:     Through:   Restrictions are:     Physical Restrictions   Sitting:    Standing:    Stooping:    Bending:      Squatting:    Walking:    Climbing:    Pushing:      Pulling:    Other:    Reaching Above Shoulder (L):   Reaching Above Shoulder (R):       Reaching Below Shoulder (L):    Reaching Below Shoulder (R):      Not to exceed Weight Limits   Carrying(hrs):   Weight Limit(lb):   Lifting(hrs):   Weight  Limit(lb):     Comments: Patient to return in 2 days for recheck or sooner for worsening symptoms.    Repetitive Actions   Hands: i.e. Fine Manipulations from Grasping:     Feet: i.e. Operating Foot Controls:     Driving / Operate Machinery:     Health Care Provider’s Original or Electronic Signature  BROOKE Alex Health Care Provider’s Original or Electronic Signature    Lukas Michaels DO MPH     Clinic Name / Location: [address] [address] Clinic Phone Number: Dept: [phone]   Appointment Time: 5:15 Pm Visit Start Time: 5:15 PM   Check-In Time:  5:40 Pm Visit Discharge Time: 6:07 PM   Original-Treating Physician or Chiropractor    Page 2-Insurer/TPA    Page 3-Employer    Page 4-Employee

## 2024-01-02 NOTE — LETTER
EMPLOYEE’S CLAIM FOR COMPENSATION/ REPORT OF INITIAL TREATMENT  FORM C-4  PLEASE TYPE OR PRINT    EMPLOYEE’S CLAIM - PROVIDE ALL INFORMATION REQUESTED   First Name                    SUSAN Thorpe Last Name  Dmitry Birthdate                    1985                Sex  []M  []F Claim Number (Insurer’s Use Only)     Home Address  [address] Age  38 y.o. 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 (if applicable)  breakroom    How did this injury or occupational disease occur? (Be specific and answer in detail. Use additional sheet if necessary)  sitting in the breakroom   If you believe that you have an occupational disease, when did you first have knowledge of the disability and its relationship to your employment?  N/A Witnesses to the Accident (if applicable)  None      Nature of Injury or Occupational Disease  Workers' Compensation  Part(s) of Body Injured or Affected  Lower Arm (L) N/A N/A    I CERTIFY THAT THE ABOVE IS TRUE AND CORRECT TO T HE BEST OF MY KNOWLEDGE AND THAT I HAVE PROVIDED THIS INFORMATION IN ORDER TO OBTAIN  THE BENEFITS OF [place], OR [place]).  I HEREBY AUTHORIZE ANY PHYSICIAN, CHIROPRACTOR, SURGEON, PRACTITIONER OR ANY OTHER PERSON, ANY HOSPITAL, INCLUDING [facility] OR [facility], ANY  MEDICAL SERVICE ORGANIZATION, ANY INSURANCE COMPANY, OR OTHER INSTITUTION OR ORGANIZATION TO RELEASE TO EACH OTHER, ANY MEDICAL OR OTHER INFORMATION, INCLUDING BENEFITS PAID OR PAYABLE, PERTINENT TO THIS INJURY OR DISEASE, EXCEPT INFORMATION RELATIVE TO DIAGNOSIS, TREATMENT AND/OR COUNSELING FOR AIDS, PSYCHOLOGICAL CONDITIONS, ALCOHOL OR CONTROLLED SUBSTANCES, FOR WHICH I MUST GIVE SPECIFIC AUTHORIZATION.  A PHOTOSTAT OF THIS AUTHORIZATION SHALL BE VALID AS THE ORIGINAL.     Date   Place Employee’s Original or  *Electronic Signature   THIS REPORT MUST BE COMPLETED AND MAILED WITHIN 3 WORKING DAYS OF TREATMENT   Place  [facility]    Name of Facility  [place]   Date 1/2/2024 Diagnosis and Description of Injury or Occupational Disease  (I89.1) Lymphangitis  (L03.114) Cellulitis of left forearm  (S50.862A,  W57.XXXA) Insect bite of left forearm, initial encounter  Diagnoses of Lymphangitis, Cellulitis of left forearm, and Insect bite of left forearm, initial encounter were pertinent to this visit. Is there evidence that the injured employee was under the influence of alcohol and/or another controlled substance at the time of accident?  []No  [] Yes (if yes, please explain)   Hour 6:02 PM  No   Treatment: Antibiotics    Have you advised the patient to remain off work five days or more?   [] Yes Indicate dates: From   To    []No If no, is the injured employee capable of: [] full duty [] modified duty                                                             Yes     If modified duty, specify any limitations / restrictions:                                                                                           "                                                                                                                                                                                                                                                                                                                        X-Ray Findings:      From information given by the employee, together with medical evidence, can you directly connect this injury or occupational disease as job incurred?  []Yes   [] No Yes    Is additional medical care by a physician indicated? []Yes [] No  Yes    Do you know of any previous injury or disease contributing to this condition or occupational disease? []Yes [] No (Explain if yes)                          No   Date  1/2/2024 Print Health Care Provider’s Name  BROOKE Alex I certify that the employer’s copy of  this form was delivered to the employer on:   Address  [address] INSURER'S USE ONLY                       [place] Zip  [address] Provider’s Tax ID Number  [id number]   Telephone  Dept: [phone]    Health Care Provider’s Original or Electronic Signature  e-FRED Jean-Baptiste Degree (MD,DO, DC,PA-C,APRN)  APRN  Choose (if applicable)      ORIGINAL - TREATING HEALTHCARE PROVIDER PAGE 2 - INSURER/TPA PAGE 3 - EMPLOYER PAGE 4 - EMPLOYEE             Form C-4 (rev.08/23)        BRIEF DESCRIPTION OF RIGHTS AND BENEFITS  (Pursuant to [place] 616[place].050)    Notice of Injury or Occupational Disease (Incident Report Form C-1): If an injury or occupational disease (OD) arises out of and in the course of employment, you must provide written notice to your employer as soon as practicable, but no later than 7 days after the accident or OD. Your employer shall maintain a sufficient supply of the required forms.    Claim for Compensation (Form C-4): If medical treatment is sought, the form C-4 is available at the place of initial treatment. A completed \"Claim " "for Compensation\" (Form C-4) must be filed within 90 days after an accident or OD. The treating physician or chiropractor must, within 3 working days after treatment, complete and mail to the employer, the employer's insurer and third-party , the Claim for Compensation.    Medical Treatment: If you require medical treatment for your on-the-job injury or OD, you may be required to select a physician or chiropractor from a list provided by your workers’ compensation insurer, if it has contracted with an Organization for Managed Care (MCO) or Preferred Provider Organization (PPO) or providers of health care. If your employer has not entered into a contract with an MCO or PPO, you may select a physician or chiropractor from the Panel of Physicians and Chiropractors. Any medical costs related to your industrial injury or OD will be paid by your insurer.    Temporary Total Disability (TTD): If your doctor has certified that you are unable to work for a period of at least 5 consecutive days, or 5 cumulative days in a 20-day period, or places restrictions on you that your employer does not accommodate, you may be entitled to TTD compensation.    Temporary Partial Disability (TPD): If the wage you receive upon reemployment is less than the compensation for TTD to which you are entitled, the insurer may be required to pay you TPD compensation to make up the difference. TPD can only be paid for a maximum of 24 months.    Permanent Partial Disability (PPD): When your medical condition is stable and there is an indication of a PPD as a result of your injury or OD, within 30 days, your insurer must arrange for an evaluation by a rating physician or chiropractor to determine the degree of your PPD. The amount of your PPD award depends on the date of injury, the results of the PPD evaluation, your age and wage.    Permanent Total Disability (PTD): If you are medically certified by a treating physician or chiropractor " as permanently and totally disabled and have been granted a PTD status by your insurer, you are entitled to receive monthly benefits not to exceed 66 2/3% of your average monthly wage. The amount of your PTD payments is subject to reduction if you previously received a lump-sum PPD award.    Vocational Rehabilitation Services: You may be eligible for vocational rehabilitation services if you are unable to return to the job due to a permanent physical impairment or permanent restrictions as a result of your injury or occupational disease.    Transportation and Per [place] Reimbursement: You may be eligible for travel expenses and per fermin associated with medical treatment.    Reopening: You may be able to reopen your claim if your condition worsens after claim closure.     Appeal Process: If you disagree with a written determination issued by the insurer or the insurer does not respond to your request, you may appeal to the Department of Administration, , by following the instructions contained in your determination letter. You must appeal the determination within 70 days from the date of the determination letter at [address], [address], [place] [address], or [address] [facility] [address], [place] [address]. If you disagree with the  decision, you may appeal to the Department of Administration, . You must file your appeal within 30 days from the date of the  decision letter at [address], [address], [place], [place] [address], or [address] [facility] [address], [place] [address]. If you disagree with a decision of an , you may file a petition for judicial review with the District Court. You must do so within 30 days of the Appeal Officer’s decision. You may be represented by an  at your own expense or you may contact the [facility] for possible representation.    [place]  for Injured Workers  (NAIW): If you disagree with a  decision, you may request that NAIW represent you without charge at an  Hearing. For information regarding denial of benefits, you may contact the [facility] at: [address], [address], [place] [address], [phone], or [address], [address], [place] [address], [phone]    To File a Complaint with the Division: If you wish to file a complaint with the  of the Division of Industrial Relations (DIR),  please contact the Workers’ Compensation Section, [address], [address], [place], [place] [address], telephone [phone], or [address], [address], [place], [place] [address], telephone [phone].    For assistance with Workers’ Compensation Issues: You may contact the [facility] Office for Consumer Health Assistance, [address] [facility] [address], [place], [place] [address], Toll Free 1-110.103.6951, Web site: http://[facility].[place].gov/Programs/HAMMAD E-mail: [email or web address]              __________________________________________________________________                                    _________________            Employee Name / Signature                                                                                                                            Date                                                                                                                                                                                                                              D-2 (rev. 10/20)

## 2024-01-03 NOTE — PROGRESS NOTES
Subjective:     Maurilio Andres is a 38 y.o. male who presents for No chief complaint on file.      HPI  DOI 2024.  Pt presents for evaluation of a new problem.  Maurilio is a very pleasant 38-year-old male presents to urgent care today after sustaining a bite to his left forearm yesterday at work.  He states that he initially noticed a small bump on his left forearm that initially was itchy.  He initially thought nothing of this and went back to work as usual.  When he got home from work he did apply topical Benadryl cream.  This morning at work he developed worsening pruritus and pain of his left forearm.  When he lifted his he noticed redness and swelling surrounding this bite wound.  He has subsequently developed a red line up his left arm.  There has been no fever, chills, nausea or vomiting.  He did notify his workplace who probably provided hydrocortisone cream.  His symptoms have progressively worsened.    Review of Systems   Constitutional:  Negative for chills and fever.   Gastrointestinal:  Negative for nausea and vomiting.       PMH:   Past Medical History:   Diagnosis Date    Anxiety     Hyperlipidemia      ALLERGIES: No Known Allergies  SURGHX:   Past Surgical History:   Procedure Laterality Date    ORIF, ANKLE Right 2006    ARTHROPLASTY      EYE SURGERY       SOCHX:   Social History     Socioeconomic History    Marital status: Single    Highest education level: 12th grade   Tobacco Use    Smoking status: Former     Current packs/day: 0.00     Average packs/day: 0.3 packs/day for 1 year (0.3 ttl pk-yrs)     Types: Cigarettes     Start date:      Quit date:      Years since quittin.0    Smokeless tobacco: Never   Vaping Use    Vaping Use: Never used   Substance and Sexual Activity    Alcohol use: Yes     Comment: socially     Drug use: No    Sexual activity: Yes     Partners: Female     Birth control/protection: None     Social Determinants of Health     Financial Resource Strain: High  Risk (8/15/2023)    Overall Financial Resource Strain (CARDIA)     Difficulty of Paying Living Expenses: Hard   Food Insecurity: No Food Insecurity (8/15/2023)    Hunger Vital Sign     Worried About Running Out of Food in the Last Year: Never true     Ran Out of Food in the Last Year: Never true   Transportation Needs: No Transportation Needs (8/15/2023)    PRAPARE - Transportation     Lack of Transportation (Medical): No     Lack of Transportation (Non-Medical): No   Physical Activity: Sufficiently Active (8/15/2023)    Exercise Vital Sign     Days of Exercise per Week: 7 days     Minutes of Exercise per Session: 60 min   Stress: Stress Concern Present (8/15/2023)    [place] of Occupational Health - Occupational Stress Questionnaire     Feeling of Stress : To some extent   Social Connections: Unknown (8/15/2023)    Social Connection and Isolation Panel [NHANES]     Frequency of Communication with Friends and Family: Once a week     Frequency of Social Gatherings with Friends and Family: Patient refused     Attends Cheondoism Services: Never     Active Member of Clubs or Organizations: No     Attends Club or Organization Meetings: Never     Marital Status: Living with partner   Housing Stability: Low Risk  (8/15/2023)    Housing Stability Vital Sign     Unable to Pay for Housing in the Last Year: No     Number of Places Lived in the Last Year: 2     Unstable Housing in the Last Year: No     FH: No family history on file.      Objective:   There were no vitals taken for this visit.    Physical Exam  Vitals and nursing note reviewed.   Constitutional:       General: He is not in acute distress.     Appearance: Normal appearance. He is normal weight. He is not ill-appearing or toxic-appearing.   HENT:      Head: Normocephalic.      Right Ear: External ear normal.      Left Ear: External ear normal.      Nose: Nose normal.      Mouth/Throat:      Mouth: Mucous membranes are moist.   Eyes:      General:          Right eye: No discharge.         Left eye: No discharge.      Extraocular Movements: Extraocular movements intact.      Conjunctiva/sclera: Conjunctivae normal.      Pupils: Pupils are equal, round, and reactive to light.   Pulmonary:      Effort: Pulmonary effort is normal.      Breath sounds: Normal breath sounds.   Abdominal:      General: Abdomen is flat.   Musculoskeletal:         General: Normal range of motion.      Cervical back: Normal range of motion and neck supple. No rigidity.   Lymphadenopathy:      Cervical: No cervical adenopathy.   Skin:     General: Skin is warm and dry.      Comments: There is a 2 x 3 skin lesion to left forearm with surrounding erythema and lymphangitis up to mid bicep.  Area of erythema was outlined with skin marker.   Neurological:      General: No focal deficit present.      Mental Status: He is alert and oriented to person, place, and time. Mental status is at baseline.   Psychiatric:         Mood and Affect: Mood normal.         Behavior: Behavior normal.         Judgment: Judgment normal.         Assessment/Plan:   Assessment      1. Lymphangitis  cefTRIAXone (Rocephin) 1 g in lidocaine (Xylocaine) 1 % 4 mL for IM use    doxycycline (VIBRAMYCIN) 100 MG Tab    Tdap =>8yo IM      2. Cellulitis of left forearm  cefTRIAXone (Rocephin) 1 g in lidocaine (Xylocaine) 1 % 4 mL for IM use    doxycycline (VIBRAMYCIN) 100 MG Tab    Tdap =>8yo IM      3. Insect bite of left forearm, initial encounter  Tdap =>8yo IM      Tdap updated today.  1g Rocephin injection given in clinic.  He tolerated this well.  He additionally was placed on p.o. course of doxycycline for further treatment of cellulitis and lymphangitis.  Patient given strict ER precautions if area of erythema exceeds outline the skin.  Patient to return in 2 days or sooner for worsening symptoms.

## 2024-01-04 ENCOUNTER — OCCUPATIONAL MEDICINE (OUTPATIENT)
Dept: URGENT CARE | Facility: PHYSICIAN GROUP | Age: 39
End: 2024-01-04
Payer: COMMERCIAL

## 2024-01-04 VITALS
DIASTOLIC BLOOD PRESSURE: 86 MMHG | TEMPERATURE: 97.9 F | RESPIRATION RATE: 16 BRPM | SYSTOLIC BLOOD PRESSURE: 106 MMHG | HEART RATE: 118 BPM | WEIGHT: 179.8 LBS | OXYGEN SATURATION: 97 % | BODY MASS INDEX: 25.17 KG/M2 | HEIGHT: 71 IN

## 2024-01-04 DIAGNOSIS — I89.1 LYMPHANGITIS: ICD-10-CM

## 2024-01-04 DIAGNOSIS — L03.114 CELLULITIS OF LEFT FOREARM: ICD-10-CM

## 2024-01-04 PROCEDURE — 3074F SYST BP LT 130 MM HG: CPT

## 2024-01-04 PROCEDURE — 99213 OFFICE O/P EST LOW 20 MIN: CPT

## 2024-01-04 PROCEDURE — 3079F DIAST BP 80-89 MM HG: CPT

## 2024-01-04 RX ORDER — OXYCODONE HYDROCHLORIDE 5 MG/1
TABLET ORAL
COMMUNITY
Start: 2023-12-18 | End: 2024-01-04

## 2024-01-04 RX ORDER — AMOXICILLIN 500 MG/1
CAPSULE ORAL
COMMUNITY
Start: 2023-12-12 | End: 2024-01-04

## 2024-01-04 RX ORDER — SULFAMETHOXAZOLE AND TRIMETHOPRIM 800; 160 MG/1; MG/1
1 TABLET ORAL 2 TIMES DAILY
Qty: 10 TABLET | Refills: 0 | Status: SHIPPED | OUTPATIENT
Start: 2024-01-04 | End: 2024-01-09

## 2024-01-04 RX ORDER — DEXAMETHASONE 4 MG/1
TABLET ORAL
COMMUNITY
Start: 2023-12-12 | End: 2024-01-04

## 2024-01-04 ASSESSMENT — FIBROSIS 4 INDEX: FIB4 SCORE: 1.06

## 2024-01-04 NOTE — PROGRESS NOTES
"Subjective:     Maurilio Andres is a 38 y.o. male who presents for Follow-Up (Follow up W/C Left arm. A lot better. Still have some irritation little itchy. )      Patient presents for his first WC FV today.  DOI: 01/01/2023.  Chief complaint left forearm cellulitis and lymphangitis  Patient reports resolution of the streaking from the volar forearm skin infection.  He states he has been unable to take the doxycycline due to profound nausea.  He states he has been taking the medication on an empty stomach.  He states the wound is , however the redness, swelling, have improved.  He denies fever, chills, body aches, joint pain.     PMH:   No pertinent past medical history to this problem  MEDS:  Medications were reviewed in EMR  ALLERGIES:  Allergies were reviewed in EMR  SOCHX:  Works as a Leadman   FH:   No pertinent family history to this problem       Objective:     /86   Pulse (!) 118   Temp 36.6 °C (97.9 °F) (Temporal)   Resp 16   Ht 1.803 m (5' 11\")   Wt 81.6 kg (179 lb 12.8 oz)   SpO2 97%   BMI 25.08 kg/m²     Left volar mid forearm is slightly tender to palpation.  There is a small erythematous papule with surrounding mild fluctuance. Trace warmth is present.  No edema, erythema, drainage.  No lymphangitis present.     Assessment/Plan:       1. Cellulitis of left forearm  - sulfamethoxazole-trimethoprim (BACTRIM DS) 800-160 MG tablet; Take 1 Tablet by mouth 2 times a day for 5 days.  Dispense: 10 Tablet; Refill: 0    2. Lymphangitis    Released to Full Duty FROM 1/4/2024 TO 1/7/2024  D39 updated today.  Patient has been unable to tolerate doxycycline.  Will substitute medication for Bactrim.  He is advised to take medication with a meal and to stay adequately hydrated.  The left mid forearm continues to have warmth, tenderness, and is mildly fluctuant.  No lymphangitis is present.  Patient is advised to keep arm clean and dry.  He may apply topical antibiotic ointment to the " papule present.  He is also recommended to apply warm packs and ice packs to the area to help with inflammation and to use NSAIDs/Tylenol per package instructions.  Patient is released to full work duty today.  Will have him follow-up in 3 days for wound check, anticipate MMI.       Differential diagnosis, natural history, supportive care, and indications for immediate follow-up discussed.

## 2024-01-04 NOTE — LETTER
[address] [place] [address]  Phone:  [phone] - Fax:  [phone]   [facility] Progress Report and Disability Certification  Date of Service: 1/4/2024   No Show:  No  Date / Time of Next Visit: 1/7/2024   Claim Information   Patient Name: Maurilio Andres  Claim Number:     Employer: [employer]  Date of Injury: 1/1/2024     Insurer / TPA: [employer]  ID / SSN:     Occupation: Leadman   Diagnosis: Diagnoses of Cellulitis of left forearm and Lymphangitis were pertinent to this visit.    Medical Information   Related to Industrial Injury? No    Subjective Complaints:  Patient presents for his first WC FV today.  DOI: 01/01/2023.  Chief complaint left forearm cellulitis and lymphangitis  Patient reports resolution of the streaking from the volar forearm skin infection.  He states he has been unable to take the doxycycline due to profound nausea.  He states he has been taking the medication on an empty stomach.  He states the wound is , however the redness, swelling, have improved.  He denies fever, chills, body aches, joint pain.    Objective Findings: Left volar mid forearm is slightly tender to palpation.  There is a small erythematous papule with surrounding mild fluctuance. Trace warmth is present.  No edema, erythema, drainage.  No lymphangitis present.    Pre-Existing Condition(s):     Assessment:   Condition Improved    Status: Additional Care Required  Permanent Disability:No    Plan: Medication    Diagnostics:      Comments:       Disability Information   Status: Released to Full Duty    From:  1/4/2024  Through: 1/7/2024 Restrictions are: Temporary   Physical Restrictions   Sitting:    Standing:    Stooping:    Bending:      Squatting:    Walking:    Climbing:    Pushing:      Pulling:    Other:    Reaching Above Shoulder (L):   Reaching Above Shoulder (R):       Reaching Below Shoulder (L):    Reaching Below  Shoulder (R):      Not to exceed Weight Limits   Carrying(hrs):   Weight Limit(lb):   Lifting(hrs):   Weight  Limit(lb):     Comments: D39 updated today.  Patient has been unable to tolerate doxycycline.  Will substitute medication for Bactrim.  He is advised to take medication with a meal and to stay adequately hydrated.  The left mid forearm continues to have warmth, tenderness, and is mildly fluctuant.  No lymphangitis is present.  Patient is advised to keep arm clean and dry.  He may apply topical antibiotic ointment to the papule present.  He is also recommended to apply warm packs and ice packs to the area to help with inflammation and to use NSAIDs/Tylenol per package instructions.  Patient is released to full work duty today.  Will have him follow-up in 3 days for wound check, anticipate MMI.    Repetitive Actions   Hands: i.e. Fine Manipulations from Grasping:     Feet: i.e. Operating Foot Controls:     Driving / Operate Machinery:     Health Care Provider’s Original or Electronic Signature  BROOKE Mendoza Health Care Provider’s Original or Electronic Signature    Lukas Michaels DO MPH     Clinic Name / Location: [address] [address] Clinic Phone Number: Dept: [phone]   Appointment Time: 10:00 Am Visit Start Time: 10:04 AM   Check-In Time:  10:01 Am Visit Discharge Time: 10:27 Am    Original-Treating Physician or Chiropractor    Page 2-Insurer/TPA    Page 3-Employer    Page 4-Employee

## 2024-01-08 ENCOUNTER — OCCUPATIONAL MEDICINE (OUTPATIENT)
Dept: URGENT CARE | Facility: PHYSICIAN GROUP | Age: 39
End: 2024-01-08
Payer: COMMERCIAL

## 2024-01-08 VITALS
DIASTOLIC BLOOD PRESSURE: 74 MMHG | SYSTOLIC BLOOD PRESSURE: 120 MMHG | TEMPERATURE: 97.8 F | HEIGHT: 70 IN | RESPIRATION RATE: 14 BRPM | WEIGHT: 181.8 LBS | BODY MASS INDEX: 26.03 KG/M2 | OXYGEN SATURATION: 98 % | HEART RATE: 88 BPM

## 2024-01-08 DIAGNOSIS — L03.114 CELLULITIS OF LEFT FOREARM: ICD-10-CM

## 2024-01-08 PROCEDURE — 1126F AMNT PAIN NOTED NONE PRSNT: CPT

## 2024-01-08 PROCEDURE — 99213 OFFICE O/P EST LOW 20 MIN: CPT

## 2024-01-08 PROCEDURE — 3078F DIAST BP <80 MM HG: CPT

## 2024-01-08 PROCEDURE — 3074F SYST BP LT 130 MM HG: CPT

## 2024-01-08 ASSESSMENT — PAIN SCALES - GENERAL: PAINLEVEL: NO PAIN

## 2024-01-08 ASSESSMENT — FIBROSIS 4 INDEX: FIB4 SCORE: 1.06

## 2024-01-08 NOTE — LETTER
[facility] [place] [address]  Phone:  [phone] - Fax:  [phone]   [facility] Progress Report and Disability Certification  Date of Service: 1/8/2024   No Show:  No  Date / Time of Next Visit:     Claim Information   Patient Name: Maurilio Andres  Claim Number:     Employer: [employer]  Date of Injury: 1/1/2024     Insurer / TPA: [employer]  ID / SSN:     Occupation: Leadman   Diagnosis: The encounter diagnosis was Cellulitis of left forearm.    Medical Information   Related to Industrial Injury? Yes    Subjective Complaints:  COPIED from First visit 1/2/24:HPI  DOI 1/1/2024.  Pt presents for evaluation of a new problem.  Maurilio is a very pleasant 38-year-old male presents to urgent care today after sustaining a bite to his left forearm yesterday at work.  He states that he initially noticed a small bump on his left forearm that initially was itchy.  He initially thought nothing of this and went back to work as usual.  When he got home from work he did apply topical Benadryl cream.  This morning at work he developed worsening pruritus and pain of his left forearm.  When he lifted his he noticed redness and swelling surrounding this bite wound.  He has subsequently developed a red line up his left arm.  There has been no fever, chills, nausea or vomiting.  He did notify his workplace who probably provided hydrocortisone cream.  His symptoms have progressively worsened.     COPIED From Second visit 1/4/24: Patient presents for his first WC [facility] today.  DOI: 01/01/2023.  Chief complaint left forearm cellulitis and lymphangitis  Patient reports resolution of the streaking from the volar forearm skin infection.  He states he has been unable to take the doxycycline due to profound nausea.  He states he has been taking the medication on an empty stomach.  He states the wound is , however the redness, swelling, have improved.   He denies fever, chills, body aches, joint pain.     Third visit 1/8/24: Patient reports full resolution of pain, redness, and swelling.  He reports that he has been to work and tolerated that well. He has taken antibiotics as prescribed.  No fevers, chills, body aches.  No joint pain.      ROS Per HPI   Objective Findings: Physical Exam  Skin:     Comments: Left forearm: Complete resolution of cellulitis.  No erythema, no swelling, no increased warmth        Pre-Existing Condition(s):     Assessment:   Condition Improved    Status: Discharged /  MMI  Permanent Disability:No    Plan:      Diagnostics:      Comments:       Disability Information   Status: Released to Full Duty    From:  1/8/2024  Through:   Restrictions are:     Physical Restrictions   Sitting:    Standing:    Stooping:    Bending:      Squatting:    Walking:    Climbing:    Pushing:      Pulling:    Other:    Reaching Above Shoulder (L):   Reaching Above Shoulder (R):       Reaching Below Shoulder (L):    Reaching Below Shoulder (R):      Not to exceed Weight Limits   Carrying(hrs):   Weight Limit(lb):   Lifting(hrs):   Weight  Limit(lb):     Comments:      Repetitive Actions   Hands: i.e. Fine Manipulations from Grasping:     Feet: i.e. Operating Foot Controls:     Driving / Operate Machinery:     Health Care Provider’s Original or Electronic Signature  Marti Mcgowan A.P.R.N. Health Care Provider’s Original or Electronic Signature    Lukas Michaels DO MPH     Clinic Name / Location: [address] [address] Clinic Phone Number: Dept: [phone]   Appointment Time: 10:00 Am Visit Start Time: 10:19 AM   Check-In Time:  10:01 Am Visit Discharge Time: 10:36 AM    Original-Treating Physician or Chiropractor    Page 2-Insurer/TPA    Page 3-Employer    Page 4-Employee

## 2024-01-08 NOTE — PROGRESS NOTES
Subjective:   Maurilio Andres is a 38 y.o. male who presents for Follow-Up (W/C, (L) arm. Pt states he's feeling good, almost done with medication )        COPIED from First visit 1/2/24:HPI  DOI 1/1/2024.  Pt presents for evaluation of a new problem.  Maurilio is a very pleasant 38-year-old male presents to urgent care today after sustaining a bite to his left forearm yesterday at work.  He states that he initially noticed a small bump on his left forearm that initially was itchy.  He initially thought nothing of this and went back to work as usual.  When he got home from work he did apply topical Benadryl cream.  This morning at work he developed worsening pruritus and pain of his left forearm.  When he lifted his he noticed redness and swelling surrounding this bite wound.  He has subsequently developed a red line up his left arm.  There has been no fever, chills, nausea or vomiting.  He did notify his workplace who probably provided hydrocortisone cream.  His symptoms have progressively worsened.     COPIED From Second visit 1/4/24: Patient presents for his first WC FV today.  DOI: 01/01/2023.  Chief complaint left forearm cellulitis and lymphangitis  Patient reports resolution of the streaking from the volar forearm skin infection.  He states he has been unable to take the doxycycline due to profound nausea.  He states he has been taking the medication on an empty stomach.  He states the wound is , however the redness, swelling, have improved.  He denies fever, chills, body aches, joint pain.     Third visit 1/8/24: Patient reports full resolution of pain, redness, and swelling.  He reports that he has been to work and tolerated that well. He has taken antibiotics as prescribed.  No fevers, chills, body aches.  No joint pain.      ROS Per HPI    Problem list, medications, and allergies reviewed by myself today in Epic.     Objective:     /74   Pulse 88   Temp 36.6 °C (97.8 °F) (Temporal)    "Resp 14   Ht 1.778 m (5' 10\")   Wt 82.5 kg (181 lb 12.8 oz)   SpO2 98%   BMI 26.09 kg/m²     Physical Exam  Skin:     Comments: Left forearm: Complete resolution of cellulitis.  No erythema, no swelling, no increased warmth         Assessment/Plan:     Diagnosis and associated orders:   1. Cellulitis of left forearm               Comments/MDM:   Pt is clinically stable at today's acute urgent care visit.  No acute distress noted. Appropriate for outpatient management at this time.     Released to full duty without restriction.  Please return for any new or worsening signs or symptoms         Discussed DDx, management options (risks,benefits, and alternatives to planned treatment), natural progression and supportive care.  Expressed understanding and the treatment plan was agreed upon. Questions were encouraged and answered   Return to urgent care prn if new or worsening sx or if there is no improvement in condition prn.    Educated in Red flags and indications to immediately call 911 or present to the Emergency Department.   Advised the patient to follow-up with the primary care physician for recheck, reevaluation, and consideration of further management.    I personally reviewed prior external notes and test results pertinent to today's visit.  I have independently reviewed and interpreted all diagnostics ordered during this urgent care acute visit.       Please note that this dictation was created using voice recognition software. I have made a reasonable attempt to correct obvious errors, but I expect that there are errors of grammar and possibly content that I did not discover before finalizing the note.    This note was electronically signed by TONNY Benito    "

## 2024-01-13 ENCOUNTER — OFFICE VISIT (OUTPATIENT)
Dept: URGENT CARE | Facility: CLINIC | Age: 39
End: 2024-01-13
Payer: COMMERCIAL

## 2024-01-13 VITALS
BODY MASS INDEX: 25.34 KG/M2 | OXYGEN SATURATION: 100 % | DIASTOLIC BLOOD PRESSURE: 70 MMHG | SYSTOLIC BLOOD PRESSURE: 118 MMHG | TEMPERATURE: 98.6 F | WEIGHT: 177 LBS | HEART RATE: 107 BPM | HEIGHT: 70 IN | RESPIRATION RATE: 18 BRPM

## 2024-01-13 DIAGNOSIS — K21.9 GASTROESOPHAGEAL REFLUX DISEASE, UNSPECIFIED WHETHER ESOPHAGITIS PRESENT: Primary | ICD-10-CM

## 2024-01-13 DIAGNOSIS — J02.9 PHARYNGITIS, UNSPECIFIED ETIOLOGY: ICD-10-CM

## 2024-01-13 DIAGNOSIS — J06.9 VIRAL URI WITH COUGH: ICD-10-CM

## 2024-01-13 LAB — S PYO DNA SPEC NAA+PROBE: NOT DETECTED

## 2024-01-13 PROCEDURE — 99213 OFFICE O/P EST LOW 20 MIN: CPT

## 2024-01-13 PROCEDURE — 3074F SYST BP LT 130 MM HG: CPT

## 2024-01-13 PROCEDURE — 87651 STREP A DNA AMP PROBE: CPT

## 2024-01-13 PROCEDURE — 3078F DIAST BP <80 MM HG: CPT

## 2024-01-13 RX ORDER — DEXAMETHASONE SODIUM PHOSPHATE 10 MG/ML
10 INJECTION INTRAMUSCULAR; INTRAVENOUS ONCE
Status: COMPLETED | OUTPATIENT
Start: 2024-01-13 | End: 2024-01-13

## 2024-01-13 RX ORDER — BENZOCAINE AND MENTHOL 15; 2.6 MG/1; MG/1
1 LOZENGE ORAL
Qty: 18 LOZENGE | Refills: 0 | Status: SHIPPED | OUTPATIENT
Start: 2024-01-13

## 2024-01-13 RX ORDER — DEXTROMETHORPHAN HYDROBROMIDE AND PROMETHAZINE HYDROCHLORIDE 15; 6.25 MG/5ML; MG/5ML
5 SYRUP ORAL
Qty: 118 ML | Refills: 0 | Status: SHIPPED | OUTPATIENT
Start: 2024-01-13

## 2024-01-13 RX ORDER — BENZONATATE 100 MG/1
100 CAPSULE ORAL 3 TIMES DAILY PRN
Qty: 60 CAPSULE | Refills: 0 | Status: SHIPPED | OUTPATIENT
Start: 2024-01-13

## 2024-01-13 RX ORDER — ESOMEPRAZOLE MAGNESIUM 40 MG/1
40 CAPSULE, DELAYED RELEASE ORAL
Qty: 30 CAPSULE | Refills: 0 | Status: SHIPPED | OUTPATIENT
Start: 2024-01-13 | End: 2024-02-12

## 2024-01-13 RX ADMIN — DEXAMETHASONE SODIUM PHOSPHATE 10 MG: 10 INJECTION INTRAMUSCULAR; INTRAVENOUS at 12:20

## 2024-01-13 ASSESSMENT — ENCOUNTER SYMPTOMS
DIARRHEA: 0
SORE THROAT: 1
EYE PAIN: 0
FEVER: 0
VOMITING: 0
DIZZINESS: 0
ABDOMINAL PAIN: 0
NAUSEA: 0
WHEEZING: 0
EYE REDNESS: 0
SPUTUM PRODUCTION: 0
STRIDOR: 0
COUGH: 1
MYALGIAS: 0
HEADACHES: 0
EYE DISCHARGE: 0
CHILLS: 0
SHORTNESS OF BREATH: 0
PALPITATIONS: 0

## 2024-01-13 ASSESSMENT — FIBROSIS 4 INDEX: FIB4 SCORE: 1.06

## 2024-01-13 NOTE — PROGRESS NOTES
Subjective:   Maurilio Andres is a 38 y.o. male who presents for Pharyngitis (Sore throat, cough x 2 days)          I introduced myself to the patient and informed them that I am a family nurse practitioner.    HPI:Maurilio comes in today c/o sore throat, cough. Onset was 2 days ago patient describes symptoms as constant. They describe the pain as sharp and scratchy. Aggravating factors include swallowing. Relieving factors include none. Treatments tried at home include  cough drops, aspirin . They describe their symptoms as moderate. States his daughter is on abx for strep throat.  Denies any known exposure to COVID, flu, RSV.      Review of Systems   Constitutional:  Negative for chills, fever and malaise/fatigue.   HENT:  Positive for sore throat. Negative for congestion and ear pain.    Eyes:  Negative for pain, discharge and redness.   Respiratory:  Positive for cough. Negative for sputum production, shortness of breath, wheezing and stridor.    Cardiovascular:  Negative for chest pain and palpitations.   Gastrointestinal:  Negative for abdominal pain, diarrhea, nausea and vomiting.   Genitourinary:  Negative for dysuria.   Musculoskeletal:  Negative for myalgias.   Skin:  Negative for rash.   Neurological:  Negative for dizziness and headaches.       Medications: esomeprazole  lisinopril Tabs  vitamin k     Allergies: Patient has no known allergies.    Problem List: does not have any pertinent problems on file.    Surgical History:  Past Surgical History:   Procedure Laterality Date    ORIF, ANKLE Right 2006    ARTHROPLASTY      EYE SURGERY         Past Social Hx:   reports that he quit smoking about 12 years ago. His smoking use included cigarettes. He started smoking about 13 years ago. He has a 0.3 pack-year smoking history. He has never used smokeless tobacco. He reports current alcohol use. He reports that he does not use drugs.     Past Family Hx:   family history is not on file.     Problem list,  "medications, and allergies reviewed by myself today in Epic.   I have documented what I find to be significant in regards to past medical, social, family and surgical history  in my HPI or under PMH/PSH/FH review section, otherwise it is noncontributory     Objective:     /70   Pulse (!) 107   Temp 37 °C (98.6 °F) (Temporal)   Resp 18   Ht 1.778 m (5' 10\")   Wt 80.3 kg (177 lb)   SpO2 100%   BMI 25.40 kg/m²     During this visit, appropriate PPE was worn, and hand hygiene was performed.    Physical Exam  Vitals reviewed.   Constitutional:       General: He is not in acute distress.     Appearance: Normal appearance. He is not ill-appearing or toxic-appearing.   HENT:      Head: Normocephalic and atraumatic.      Right Ear: Tympanic membrane, ear canal and external ear normal. There is no impacted cerumen.      Left Ear: Tympanic membrane, ear canal and external ear normal. There is no impacted cerumen.      Nose: No congestion or rhinorrhea.      Mouth/Throat:      Pharynx: Oropharynx is clear. Posterior oropharyngeal erythema present. No oropharyngeal exudate.      Comments: Tonsillar swelling 2+ bilaterally with erythema, no exudates.  No soft tissue swelling of the sublingual mucosa, no petechia or swelling of the soft or hard palate, no unilarteral oropharynx swelling, no sign of tonsillar stone, epiglottitis, or abscess.  Airway is patent and there is no stridor.  Patient is managing oral secretions appropriately.  Uvula is midline and appropriate size with no erythema or edema.    Eyes:      General: No scleral icterus.        Right eye: No discharge.         Left eye: No discharge.      Extraocular Movements: Extraocular movements intact.      Conjunctiva/sclera: Conjunctivae normal.      Pupils: Pupils are equal, round, and reactive to light.   Cardiovascular:      Rate and Rhythm: Normal rate and regular rhythm.      Heart sounds: Normal heart sounds. No murmur heard.     No friction rub. No " gallop.   Pulmonary:      Effort: Pulmonary effort is normal. No respiratory distress.      Breath sounds: Normal breath sounds. No wheezing, rhonchi or rales.   Abdominal:      General: There is no distension.   Musculoskeletal:         General: Normal range of motion.      Cervical back: Normal range of motion. No rigidity.      Right lower leg: No edema.      Left lower leg: No edema.   Lymphadenopathy:      Cervical: Cervical adenopathy present.   Skin:     General: Skin is warm and dry.      Coloration: Skin is not jaundiced.   Neurological:      Mental Status: He is alert and oriented to person, place, and time. Mental status is at baseline.   Psychiatric:         Mood and Affect: Mood normal.         Behavior: Behavior normal.       Lab Results/POC Test Results   Results for orders placed or performed in visit on 01/13/24   POCT CEPHEID GROUP A STREP - PCR   Result Value Ref Range    POC Group A Strep, PCR Not Detected Not Detected, Invalid             Assessment/Plan:     Diagnosis and associated orders:     1. Gastroesophageal reflux disease, unspecified whether esophagitis present  esomeprazole (NEXIUM) 40 MG delayed-release capsule      2. Pharyngitis, unspecified etiology  POCT CEPHEID GROUP A STREP - PCR    dexamethasone (Decadron) injection (check route below) 10 mg    benzocaine-menthol (CEPACOL EXTRA STRENGTH) 15-2.6 MG Lozenge lozenge      3. Viral URI with cough  benzonatate (TESSALON) 100 MG Cap    promethazine-dextromethorphan (PROMETHAZINE-DM) 6.25-15 MG/5ML syrup         Comments/MDM:     1. Pharyngitis, unspecified etiology  I did call patient discussed with him that the strep PCR in clinic today was negative, so the cause of his sore throat is likely viral infection.  I did offer patient a dose of Decadron in clinic today to help relieve inflamed throat tissue, instructed them regarding purpose, side effects, precautions.  They state they understand, and want to go ahead with the dose.  I did  keep them in clinic for 10 minutes after the dose, they tolerated well with no adverse reactions before discharge.    - POCT CEPHEID GROUP A STREP - PCR  - dexamethasone (Decadron) injection (check route below) 10 mg  - benzocaine-menthol (CEPACOL EXTRA STRENGTH) 15-2.6 MG Lozenge lozenge; Dissolve 1 Lozenge in the mouth every 2 hours as needed (sore throat).  Dispense: 18 Lozenge; Refill: 0    2. Viral URI with cough    We discussed viral versus bacterial infection, and I informed patient that they have a self-limiting viral URI at this time and antibiotics are not an effective treatment for this.    I instructed them that the management for this is supportive care-we discussed cough/deep breathing exercises, drink plenty of fluids, get plenty of rest, try a humidifier in bedroom at night to help them sleep, gargle with salt water/honey/OTC Cepacol lozenges to help ease sore throat.    I instr patient they may use OTC cold and flu meds to treat symptoms  (caution regarding checking ingredients as some meds contain tylenol); may use tylenol alternated with ibuprofen for pain/fever - may take tylenol 1000mg every 6 hours, do not exceed 3000 mg in 24 hours; ibuprofen (if not contraindicated) start with lowest effective dose, 200mg every 8 hours, may increase to up to 400 mg every 8 hours if needed, take with food.  Patient states cough is troublesome and is asking for something to suppress it, especially during the night when cough is keeping them awake.  Instruct them regarding Tessalon pearls, purpose, side effects, precautions, may take it during daytime, will not make you drowsy;  Instructed patient regarding Promethazine DM for nighttime to help with sleep and cough suppression, purpose, S/E, precautions including the sedating effects of promethazine, not recommended in elderly patients, increased fall risk, fall precautions. They state they understand, they feel that the benefits at this point will outweigh the  risks, would like to go ahead with the prescription as they state they need to get some sleep.     Patient was instructed that they should feel better in 7-10 days, (but some viral illnesses can last 2 weeks or longer, with residual cough possible for over a month) but to return to clinic if symptoms do not improve or worsen after 10-14 days.   We did discuss red flags and when to return to urgent care versus when to go to the emergency room and strict ER precautions were given.    I did print written instructions for patient, and did go over the diagnosis including differentials, and side effects of each medication with them and answer their questions to the best of my ability.   Advised the patient to follow-up with the primary care physician for recheck, reevaluation, and consideration of further management.  Strict ER precautions discussed for any  chest pain, difficulty breathing, difficulty swallowing, wheezing, stridor, or drooling, fever that does not respond to ibuprofen and Tylenol, inability to tolerate fluids, dehydration, lethargy.    Patient states they have good understanding and they are agreeable with the plan of care.     - benzonatate (TESSALON) 100 MG Cap; Take 1 Capsule by mouth 3 times a day as needed for Cough.  Dispense: 60 Capsule; Refill: 0  - promethazine-dextromethorphan (PROMETHAZINE-DM) 6.25-15 MG/5ML syrup; Take 5 mL by mouth at bedtime as needed for Cough (cough).  Dispense: 118 mL; Refill: 0    3. Gastroesophageal reflux disease, unspecified whether esophagitis present  Patient states his PCP instructed him to take 40 mg of Nexium daily for his GERD symptoms, states he has been buying the 20 mg OTC Nexium.  He is asking if I can prescribe the 40 mg Nexium and may be can get this on his insurance and it will be cheaper with a co-pay.  I discussed with him I will refill Nexium 40 mg take 1 capsule every morning before breakfast, 30-day supply, he has to follow-up with his PCP ASAP for  any further refills.  He states he understands and is agreeable  - esomeprazole (NEXIUM) 40 MG delayed-release capsule; Take 1 Capsule by mouth every morning before breakfast for 30 days.  Dispense: 30 Capsule; Refill: 0         Pt is clinically stable at today's acute urgent care visit. Vital signs are normal and reassuring.  No acute distress noted. Appropriate for outpatient management at this time.        I personally reviewed prior external notes and test results pertinent to today's visit.  I have independently reviewed and interpreted all diagnostics ordered during this urgent care acute visit.        Please note that this dictation was created using voice recognition software. I have made a reasonable attempt to correct obvious errors, but I expect that there are errors of grammar and possibly content that I did not discover before finalizing the note.    This note was electronically signed by Gagan LANCASTER, SOLO, KAL, MARIO

## 2024-06-07 ENCOUNTER — OFFICE VISIT (OUTPATIENT)
Dept: MEDICAL GROUP | Facility: PHYSICIAN GROUP | Age: 39
End: 2024-06-07
Payer: COMMERCIAL

## 2024-06-07 VITALS
SYSTOLIC BLOOD PRESSURE: 140 MMHG | WEIGHT: 180.2 LBS | OXYGEN SATURATION: 98 % | DIASTOLIC BLOOD PRESSURE: 70 MMHG | TEMPERATURE: 98.8 F | RESPIRATION RATE: 18 BRPM | BODY MASS INDEX: 25.8 KG/M2 | HEIGHT: 70 IN | HEART RATE: 86 BPM

## 2024-06-07 DIAGNOSIS — R53.83 OTHER FATIGUE: ICD-10-CM

## 2024-06-07 DIAGNOSIS — R13.10 DYSPHAGIA, UNSPECIFIED TYPE: ICD-10-CM

## 2024-06-07 DIAGNOSIS — R90.89 ABNORMAL BRAIN CT: ICD-10-CM

## 2024-06-07 DIAGNOSIS — E78.5 DYSLIPIDEMIA: ICD-10-CM

## 2024-06-07 DIAGNOSIS — I10 ESSENTIAL HYPERTENSION: ICD-10-CM

## 2024-06-07 DIAGNOSIS — R04.0 EPISTAXIS: ICD-10-CM

## 2024-06-07 PROCEDURE — 3078F DIAST BP <80 MM HG: CPT | Performed by: NURSE PRACTITIONER

## 2024-06-07 PROCEDURE — 3077F SYST BP >= 140 MM HG: CPT | Performed by: NURSE PRACTITIONER

## 2024-06-07 PROCEDURE — 99214 OFFICE O/P EST MOD 30 MIN: CPT | Performed by: NURSE PRACTITIONER

## 2024-06-07 ASSESSMENT — ENCOUNTER SYMPTOMS
GASTROINTESTINAL NEGATIVE: 1
SHORTNESS OF BREATH: 0
NEUROLOGICAL NEGATIVE: 1
EYES NEGATIVE: 1
CHILLS: 0
MUSCULOSKELETAL NEGATIVE: 1
PALPITATIONS: 0
PSYCHIATRIC NEGATIVE: 1
WEIGHT LOSS: 0
FEVER: 0

## 2024-06-07 ASSESSMENT — PATIENT HEALTH QUESTIONNAIRE - PHQ9: CLINICAL INTERPRETATION OF PHQ2 SCORE: 0

## 2024-06-07 ASSESSMENT — FIBROSIS 4 INDEX: FIB4 SCORE: 1.06

## 2024-06-07 NOTE — PROGRESS NOTES
Verbal consent was acquired by the patient to use [employer] ambient listening note generation during this visit     CC:  Chief Complaint   Patient presents with    [facility]     Prior PCP Dr Crowell     Requesting Labs     Routine labwork, no hep c or hiv        Maurilio Andres 38 y.o. male  patient presenting for     History of Present Illness  The patient is a 38-year-old male who is coming in today to establish care, looking for some blood work.    Chronic cough  The patient continues his regimen of Cepacol tablets for his sore throat and Tessalon Perles for cough management. He reports a persistent cough since his third COVID-19 infection, which disrupts his sleep.    History of episaxis  The patient experiences difficulty swallowing vitamin K, a condition he has been managing for the past 8 years. He was previously advised to take vitamin K twice daily. Despite a previous physician's advice, he experienced an obstruction in his throat, which eventually resolved upon lying down. He was subsequently prescribed Nexium 10 mg, two tablets daily, which he continues to take. Despite discontinuing the medication, he continued to experience swallowing difficulties. He has not been referred to a gastroenterologist. He denies experiencing heartburn, chewing tobacco, or smoking. He reports no issues with water consumption, but notes a noticeable difference when he discontinues vitamin K and water intake for a day or two. His vitamin K test returned abnormal results, prompting another order for vitamin K, which he did not complete. He underwent additional lab tests at [facility] a year ago, but was unable to review the results.    fatigue  The patient expresses a desire to have his testosterone levels checked. He reports fatigue, which he attributes to extensive work. He denies depression but reports a lack of interest. His erectile dysfunction has improved over the past year. He denies excessive use of aspirin or  "ibuprofen.    Abnormal CT head  Approximately 8 years ago, the patient was involved in a work-related accident, during which he sustained a head injury. A CT scan revealed a right-sided arachnoid cyst, but no significant concerns were raised. He is considering further evaluation. He reports an improvement in his headaches following LASIK surgery.    HTN  He is on lisinopril 10 mg for his blood pressure. He does not check his blood pressure at home. He denies any vision changes or hearing issues.       Review of Systems   Constitutional:  Negative for chills, fever, malaise/fatigue and weight loss.   HENT: Negative.     Eyes: Negative.    Respiratory:  Negative for shortness of breath.    Cardiovascular:  Negative for chest pain and palpitations.   Gastrointestinal: Negative.    Genitourinary: Negative.    Musculoskeletal: Negative.    Skin: Negative.    Neurological: Negative.    Psychiatric/Behavioral: Negative.         BP (!) 140/70   Pulse 86   Temp 37.1 °C (98.8 °F) (Temporal)   Resp 18   Ht 1.778 m (5' 10\")   Wt 81.7 kg (180 lb 3.2 oz)   SpO2 98% , Body mass index is 25.86 kg/m².    Physical Exam  Constitutional:       Appearance: Normal appearance. He is normal weight.   HENT:      Head: Normocephalic and atraumatic.      Right Ear: Tympanic membrane, ear canal and external ear normal.      Left Ear: Tympanic membrane, ear canal and external ear normal.      Nose: Nose normal.      Mouth/Throat:      Mouth: Mucous membranes are moist.      Pharynx: Oropharynx is clear.   Eyes:      Extraocular Movements: Extraocular movements intact.      Conjunctiva/sclera: Conjunctivae normal.      Pupils: Pupils are equal, round, and reactive to light.   Cardiovascular:      Rate and Rhythm: Normal rate and regular rhythm.      Pulses: Normal pulses.      Heart sounds: Normal heart sounds.   Pulmonary:      Effort: Pulmonary effort is normal.      Breath sounds: Normal breath sounds.   Musculoskeletal:         " General: Normal range of motion.      Cervical back: Normal range of motion and neck supple.   Skin:     General: Skin is warm and dry.   Neurological:      General: No focal deficit present.      Mental Status: He is alert and oriented to person, place, and time. Mental status is at baseline.   Psychiatric:         Mood and Affect: Mood normal.         Behavior: Behavior normal.         Thought Content: Thought content normal.         Judgment: Judgment normal.           Results  Laboratory Studies 7/10/2023, 8/16/2024  Glucose slightly elevated. Alkaline phosphatase slightly elevated. GFR 87.   Lipid profile cholesterol 282 triglycerides 238 HDL 41   Vitamin D normal  32  Thyroid normal.   CBC-No anemia.   PT and INR stable.   Factor V normal.    Assessment and Plan    Assessment & Plan       1. Dysphagia, unspecified type  Chronic and stable condition  - esomeprazole (NEXIUM) 20 MG capsule; Take 1 Capsule by mouth every morning before breakfast.  Dispense: 180 Capsule; Refill: 0  - Referral to Gastroenterology  - Comp Metabolic Panel; Future    2. Abnormal brain CT  Chronic and stable condition  We will attempt to reach out to radiology and see if they recommend doing surveillance    3. Dyslipidemia  Chronic and stable condition  - Lipid Profile; Future  - Comp Metabolic Panel; Future    4. Other fatigue  Chronic and stable condition  - Testosterone, Free & Total, Adult Male (w/SHBG); Future  - VITAMIN K; Future    5. Essential hypertension  Chronic and stable condition  By patient with blood pressure journal  - Comp Metabolic Panel; Future  - VITAMIN K; Future    6. Epistaxis  Chronic and stable condition  - VITAMIN K; Future         Discussed with patient possible alternative diagnoses, patient is to take all medications as prescribed.     If symptoms persist FU w/PCP, if symptoms worsen go to emergency room.     If experiencing any side effects from prescribed medications reports to the office  immediately or go to emergency room.    Reviewed indication, dosage, usage and potential adverse effects of prescribed medications.     Reviewed risks and benefits of treatment plan. Patient verbalizes understanding of all instruction and verbally agrees to plan.    Return for pending labs.    This note was created using voice recognition software ([employer]). The accuracy of the dictation is limited by the abilities of the software. I have reviewed the note prior to signing, however some errors in grammar and context are still possible. If you have any questions related to this note please do not hesitate to contact our office.

## 2024-06-11 DIAGNOSIS — R13.10 DYSPHAGIA, UNSPECIFIED TYPE: ICD-10-CM

## 2024-06-11 RX ORDER — PANTOPRAZOLE SODIUM 40 MG/1
40 TABLET, DELAYED RELEASE ORAL DAILY
Qty: 90 TABLET | Refills: 0 | Status: SHIPPED | OUTPATIENT
Start: 2024-06-11 | End: 2024-06-12

## 2024-06-12 DIAGNOSIS — R13.10 DYSPHAGIA, UNSPECIFIED TYPE: ICD-10-CM

## 2024-06-12 RX ORDER — PANTOPRAZOLE SODIUM 40 MG/1
40 TABLET, DELAYED RELEASE ORAL DAILY
Qty: 90 TABLET | Refills: 0 | Status: SHIPPED | OUTPATIENT
Start: 2024-06-12 | End: 2024-06-18

## 2024-06-12 RX ORDER — LANSOPRAZOLE 30 MG/1
30 CAPSULE, DELAYED RELEASE ORAL DAILY
Qty: 90 CAPSULE | Refills: 0 | Status: SHIPPED | OUTPATIENT
Start: 2024-06-12 | End: 2024-06-12

## 2024-06-13 ENCOUNTER — HOSPITAL ENCOUNTER (OUTPATIENT)
Dept: LAB | Facility: MEDICAL CENTER | Age: 39
End: 2024-06-13
Attending: NURSE PRACTITIONER
Payer: COMMERCIAL

## 2024-06-13 DIAGNOSIS — R13.10 DYSPHAGIA, UNSPECIFIED TYPE: ICD-10-CM

## 2024-06-13 DIAGNOSIS — I10 ESSENTIAL HYPERTENSION: ICD-10-CM

## 2024-06-13 DIAGNOSIS — E78.5 DYSLIPIDEMIA: ICD-10-CM

## 2024-06-13 DIAGNOSIS — R53.83 OTHER FATIGUE: ICD-10-CM

## 2024-06-13 DIAGNOSIS — R04.0 EPISTAXIS: ICD-10-CM

## 2024-06-13 LAB
ALBUMIN SERPL BCP-MCNC: 4.6 G/DL (ref 3.2–4.9)
ALBUMIN/GLOB SERPL: 1.9 G/DL
ALP SERPL-CCNC: 98 U/L (ref 30–99)
ALT SERPL-CCNC: 30 U/L (ref 2–50)
ANION GAP SERPL CALC-SCNC: 14 MMOL/L (ref 7–16)
AST SERPL-CCNC: 28 U/L (ref 12–45)
BILIRUB SERPL-MCNC: 0.4 MG/DL (ref 0.1–1.5)
BUN SERPL-MCNC: 15 MG/DL (ref 8–22)
CALCIUM ALBUM COR SERPL-MCNC: 9 MG/DL (ref 8.5–10.5)
CALCIUM SERPL-MCNC: 9.5 MG/DL (ref 8.5–10.5)
CHLORIDE SERPL-SCNC: 102 MMOL/L (ref 96–112)
CHOLEST SERPL-MCNC: 256 MG/DL (ref 100–199)
CO2 SERPL-SCNC: 23 MMOL/L (ref 20–33)
CREAT SERPL-MCNC: 1.2 MG/DL (ref 0.5–1.4)
FASTING STATUS PATIENT QL REPORTED: NORMAL
GFR SERPLBLD CREATININE-BSD FMLA CKD-EPI: 79 ML/MIN/1.73 M 2
GLOBULIN SER CALC-MCNC: 2.4 G/DL (ref 1.9–3.5)
GLUCOSE SERPL-MCNC: 101 MG/DL (ref 65–99)
HDLC SERPL-MCNC: 42 MG/DL
LDLC SERPL CALC-MCNC: 184 MG/DL
POTASSIUM SERPL-SCNC: 4.7 MMOL/L (ref 3.6–5.5)
PROT SERPL-MCNC: 7 G/DL (ref 6–8.2)
SODIUM SERPL-SCNC: 139 MMOL/L (ref 135–145)
TRIGL SERPL-MCNC: 149 MG/DL (ref 0–149)

## 2024-06-13 PROCEDURE — 80053 COMPREHEN METABOLIC PANEL: CPT

## 2024-06-13 PROCEDURE — 84403 ASSAY OF TOTAL TESTOSTERONE: CPT

## 2024-06-13 PROCEDURE — 84402 ASSAY OF FREE TESTOSTERONE: CPT

## 2024-06-13 PROCEDURE — 36415 COLL VENOUS BLD VENIPUNCTURE: CPT

## 2024-06-13 PROCEDURE — 80061 LIPID PANEL: CPT

## 2024-06-13 PROCEDURE — 84270 ASSAY OF SEX HORMONE GLOBUL: CPT

## 2024-06-13 PROCEDURE — 84597 ASSAY OF VITAMIN K: CPT

## 2024-06-16 LAB
SHBG SERPL-SCNC: 39 NMOL/L (ref 17–56)
TESTOST FREE MFR SERPL: 1.8 % (ref 1.6–2.9)
TESTOST FREE SERPL-MCNC: 130 PG/ML (ref 47–244)
TESTOST SERPL-MCNC: 714 NG/DL (ref 300–1080)

## 2024-06-18 RX ORDER — LANSOPRAZOLE 30 MG/1
30 CAPSULE, DELAYED RELEASE ORAL DAILY
Qty: 90 CAPSULE | Refills: 0 | Status: SHIPPED | OUTPATIENT
Start: 2024-06-18

## 2024-06-20 LAB — PHYTONADIONE SERPL-MCNC: 0.87 NMOL/L (ref 0.22–4.88)

## 2024-06-26 ENCOUNTER — TELEPHONE (OUTPATIENT)
Dept: MEDICAL GROUP | Facility: PHYSICIAN GROUP | Age: 39
End: 2024-06-26
Payer: COMMERCIAL

## 2024-06-26 NOTE — TELEPHONE ENCOUNTER
Pt here for her for new pt visit  Ph [phone]  Pharmacy w/ pt   Spoke to pharmacy in regards to patient's medication. Pharmacist tried every alternative in that class to see what insurance covers and it does not look like there is an alternative. Please advise.    Left message for patient to inform that nothing is covered under his insurance and Paige will review and see if there is a different kind of medication that can be prescribed.

## 2024-09-16 DIAGNOSIS — I10 ESSENTIAL HYPERTENSION: ICD-10-CM

## 2024-09-17 RX ORDER — LISINOPRIL 10 MG/1
10 TABLET ORAL DAILY
Qty: 90 TABLET | Refills: 3 | Status: SHIPPED | OUTPATIENT
Start: 2024-09-17

## 2024-11-15 ENCOUNTER — HOSPITAL ENCOUNTER (OUTPATIENT)
Dept: LAB | Facility: MEDICAL CENTER | Age: 39
End: 2024-11-15
Attending: PODIATRIST
Payer: COMMERCIAL

## 2024-11-15 DIAGNOSIS — Z01.818 PREOPERATIVE TESTING: ICD-10-CM

## 2024-11-15 LAB
ANION GAP SERPL CALC-SCNC: 11 MMOL/L (ref 7–16)
BUN SERPL-MCNC: 17 MG/DL (ref 8–22)
CALCIUM SERPL-MCNC: 9.4 MG/DL (ref 8.5–10.5)
CHLORIDE SERPL-SCNC: 101 MMOL/L (ref 96–112)
CO2 SERPL-SCNC: 26 MMOL/L (ref 20–33)
CREAT SERPL-MCNC: 1.31 MG/DL (ref 0.5–1.4)
ERYTHROCYTE [DISTWIDTH] IN BLOOD BY AUTOMATED COUNT: 38.9 FL (ref 35.9–50)
GFR SERPLBLD CREATININE-BSD FMLA CKD-EPI: 71 ML/MIN/1.73 M 2
GLUCOSE SERPL-MCNC: 101 MG/DL (ref 65–99)
HCT VFR BLD AUTO: 48.2 % (ref 42–52)
HGB BLD-MCNC: 16.3 G/DL (ref 14–18)
MCH RBC QN AUTO: 29.3 PG (ref 27–33)
MCHC RBC AUTO-ENTMCNC: 33.8 G/DL (ref 32.3–36.5)
MCV RBC AUTO: 86.5 FL (ref 81.4–97.8)
PLATELET # BLD AUTO: 300 K/UL (ref 164–446)
PMV BLD AUTO: 9.1 FL (ref 9–12.9)
POTASSIUM SERPL-SCNC: 4.3 MMOL/L (ref 3.6–5.5)
RBC # BLD AUTO: 5.57 M/UL (ref 4.7–6.1)
SODIUM SERPL-SCNC: 138 MMOL/L (ref 135–145)
WBC # BLD AUTO: 7.8 K/UL (ref 4.8–10.8)

## 2024-11-15 PROCEDURE — 85027 COMPLETE CBC AUTOMATED: CPT

## 2024-11-15 PROCEDURE — 80048 BASIC METABOLIC PNL TOTAL CA: CPT

## 2024-11-15 PROCEDURE — 36415 COLL VENOUS BLD VENIPUNCTURE: CPT

## 2024-12-05 ENCOUNTER — OFFICE VISIT (OUTPATIENT)
Dept: MEDICAL GROUP | Facility: PHYSICIAN GROUP | Age: 39
End: 2024-12-05
Payer: COMMERCIAL

## 2024-12-05 VITALS
OXYGEN SATURATION: 97 % | SYSTOLIC BLOOD PRESSURE: 126 MMHG | WEIGHT: 186.6 LBS | HEIGHT: 70 IN | HEART RATE: 103 BPM | RESPIRATION RATE: 18 BRPM | TEMPERATURE: 98.4 F | BODY MASS INDEX: 26.71 KG/M2 | DIASTOLIC BLOOD PRESSURE: 80 MMHG

## 2024-12-05 DIAGNOSIS — R01.1 MURMUR, CARDIAC: ICD-10-CM

## 2024-12-05 PROBLEM — R13.19 ESOPHAGEAL DYSPHAGIA: Status: ACTIVE | Noted: 2024-12-05

## 2024-12-05 PROBLEM — M25.572 ARTHRALGIA OF LEFT ANKLE: Status: ACTIVE | Noted: 2024-10-02

## 2024-12-05 PROBLEM — T84.498A MECHANICAL COMPLICATION OF INTERNAL ORTHOPEDIC DEVICE (HCC): Status: RESOLVED | Noted: 2024-10-02 | Resolved: 2024-12-05

## 2024-12-05 PROBLEM — K44.9 DIAPHRAGMATIC HERNIA WITHOUT OBSTRUCTION OR GANGRENE: Status: ACTIVE | Noted: 2024-09-20

## 2024-12-05 PROBLEM — K22.2 ESOPHAGEAL OBSTRUCTION: Status: ACTIVE | Noted: 2024-09-20

## 2024-12-05 PROBLEM — M25.571 ARTHRALGIA OF RIGHT ANKLE: Status: ACTIVE | Noted: 2024-10-02

## 2024-12-05 PROBLEM — K22.10 ULCER OF ESOPHAGUS WITHOUT BLEEDING: Status: ACTIVE | Noted: 2024-09-20

## 2024-12-05 PROCEDURE — 3079F DIAST BP 80-89 MM HG: CPT | Performed by: NURSE PRACTITIONER

## 2024-12-05 PROCEDURE — 99213 OFFICE O/P EST LOW 20 MIN: CPT | Performed by: NURSE PRACTITIONER

## 2024-12-05 PROCEDURE — 3074F SYST BP LT 130 MM HG: CPT | Performed by: NURSE PRACTITIONER

## 2024-12-05 RX ORDER — OXYCODONE AND ACETAMINOPHEN 5; 325 MG/1; MG/1
TABLET ORAL
COMMUNITY

## 2024-12-05 RX ORDER — OMEPRAZOLE 40 MG/1
CAPSULE, DELAYED RELEASE ORAL
COMMUNITY
Start: 2024-09-20

## 2024-12-05 ASSESSMENT — ENCOUNTER SYMPTOMS
WEIGHT LOSS: 0
NERVOUS/ANXIOUS: 1
DIZZINESS: 0
SHORTNESS OF BREATH: 0
PALPITATIONS: 0
CHILLS: 0
HEADACHES: 0
FEVER: 0

## 2024-12-05 ASSESSMENT — FIBROSIS 4 INDEX: FIB4 SCORE: 0.68

## 2024-12-05 NOTE — PROGRESS NOTES
"Verbal consent was acquired by the patient to use [employer] ambient listening note generation during this visit     CC:  Chief Complaint   Patient presents with    Follow-Up     Has heart murmur       Maurilio Andres 39 y.o. male  patient presenting for     History of Present Illness  The patient presents for evaluation of a possible heart murmur.    He recently underwent a physical examination for work, during which a heart murmur was detected by the provider. This was a new finding for him, as he had never been informed of this before. His heart rate was initially high during the physical but normalized after a period of rest. He reports no dizziness, shortness of breath, or palpitations. Additionally, he does not experience any swelling in his lower extremities. He denies increase in heart rate prior to his appt today.     He has been active since his surgery two weeks ago, which involved the removal of two screws from his right ankle. He continues to experience pain in both ankles, which has been attributed to arthritis.    He does not monitor his blood pressure at home. His current medication includes lisinopril 10 mg.    FAMILY HISTORY  His sister had heart murmur when she was younger.      Review of Systems   Constitutional:  Negative for chills, fever, malaise/fatigue and weight loss.   Respiratory:  Negative for shortness of breath.    Cardiovascular:  Negative for chest pain, palpitations and leg swelling.   Neurological:  Negative for dizziness and headaches.   Psychiatric/Behavioral:  The patient is nervous/anxious.        /80   Pulse (!) 103   Temp 36.9 °C (98.4 °F) (Temporal)   Resp 18   Ht 1.778 m (5' 10\")   Wt 84.6 kg (186 lb 9.6 oz)   SpO2 97% , Body mass index is 26.77 kg/m².    Physical Exam  Constitutional:       Appearance: Normal appearance.   HENT:      Head: Normocephalic and atraumatic.   Cardiovascular:      Rate and Rhythm: Regular rhythm. Tachycardia present.      Pulses: " Normal pulses.      Heart sounds: Murmur heard.      No friction rub. No gallop.   Pulmonary:      Effort: Pulmonary effort is normal. No respiratory distress.      Breath sounds: Normal breath sounds. No stridor. No wheezing, rhonchi or rales.   Chest:      Chest wall: No tenderness.   Musculoskeletal:         General: Normal range of motion.      Cervical back: Normal range of motion.      Right lower leg: No edema.      Left lower leg: No edema.   Skin:     General: Skin is warm and dry.      Capillary Refill: Capillary refill takes less than 2 seconds.   Neurological:      Mental Status: He is alert and oriented to person, place, and time.   Psychiatric:         Mood and Affect: Mood normal.         Behavior: Behavior normal.         Thought Content: Thought content normal.         Judgment: Judgment normal.           Results      Assessment and Plan    Assessment & Plan  1. Heart murmur.  his heart rate was notably elevated, which he feels is likely due to anxiety. His blood pressure readings were within normal limits. An echocardiogram has been ordered to further investigate the heart murmur. He has been advised to abstain from energy drinks and to monitor his blood pressure and heart rate twice daily for a week, recording the readings and sending them via [employer]. Should he experience symptoms such as leg swelling, increased shortness of breath, dizziness, or palpitations, he is to inform me immediately.    2. Elevated heart rate.  His heart rate was elevated during the visit, likely due to anxiety. He is advised to avoid energy drinks and monitor his heart rate and blood pressure at home. If his heart rate remains elevated, further evaluation may be necessary.    3. Anxiety.  He reports feeling anxious, especially in medical settings. This anxiety may be contributing to his elevated heart rate. He is advised to monitor his symptoms and report any significant changes.        1. Murmur, cardiac  Undiagnosed  new condition with uncertain prognosis   - EC-ECHOCARDIOGRAM COMPLETE W/O CONT; Future     Discussed with patient possible alternative diagnoses, patient is to take all medications as prescribed.     If symptoms persist FU w/PCP, if symptoms worsen go to emergency room.     If experiencing any side effects from prescribed medications reports to the office immediately or go to emergency room.    Reviewed indication, dosage, usage and potential adverse effects of prescribed medications.     Reviewed risks and benefits of treatment plan. Patient verbalizes understanding of all instruction and verbally agrees to plan.    Return for pending echo results.    This note was created using voice recognition software ([employer]). The accuracy of the dictation is limited by the abilities of the software. I have reviewed the note prior to signing, however some errors in grammar and context are still possible. If you have any questions related to this note please do not hesitate to contact our office.

## 2025-01-15 ENCOUNTER — ANCILLARY PROCEDURE (OUTPATIENT)
Dept: CARDIOLOGY | Facility: MEDICAL CENTER | Age: 40
End: 2025-01-15
Attending: NURSE PRACTITIONER
Payer: COMMERCIAL

## 2025-01-15 DIAGNOSIS — R01.1 MURMUR, CARDIAC: ICD-10-CM

## 2025-01-15 LAB
LV EJECT FRACT  99904: 59
LV EJECT FRACT MOD 2C 99903: 54.56
LV EJECT FRACT MOD 4C 99902: 63.41
LV EJECT FRACT MOD BP 99901: 59.05

## 2025-01-15 PROCEDURE — 93306 TTE W/DOPPLER COMPLETE: CPT | Mod: 26 | Performed by: INTERNAL MEDICINE

## 2025-01-15 PROCEDURE — 93306 TTE W/DOPPLER COMPLETE: CPT

## 2025-01-29 ENCOUNTER — OFFICE VISIT (OUTPATIENT)
Dept: MEDICAL GROUP | Facility: PHYSICIAN GROUP | Age: 40
End: 2025-01-29
Payer: COMMERCIAL

## 2025-01-29 VITALS
BODY MASS INDEX: 25.77 KG/M2 | TEMPERATURE: 98.8 F | SYSTOLIC BLOOD PRESSURE: 146 MMHG | OXYGEN SATURATION: 99 % | HEIGHT: 70 IN | HEART RATE: 89 BPM | WEIGHT: 180 LBS | DIASTOLIC BLOOD PRESSURE: 84 MMHG

## 2025-01-29 DIAGNOSIS — E78.5 DYSLIPIDEMIA: ICD-10-CM

## 2025-01-29 DIAGNOSIS — I10 ESSENTIAL HYPERTENSION: ICD-10-CM

## 2025-01-29 ASSESSMENT — ENCOUNTER SYMPTOMS
DIZZINESS: 0
FEVER: 0
WEIGHT LOSS: 0
PALPITATIONS: 0
HEADACHES: 0
SHORTNESS OF BREATH: 0
NERVOUS/ANXIOUS: 1
CHILLS: 0

## 2025-01-29 ASSESSMENT — FIBROSIS 4 INDEX: FIB4 SCORE: 0.68

## 2025-01-29 ASSESSMENT — PATIENT HEALTH QUESTIONNAIRE - PHQ9: CLINICAL INTERPRETATION OF PHQ2 SCORE: 0

## 2025-01-30 NOTE — PROGRESS NOTES
Verbal consent was acquired by the patient to use [email or web address] ambient listening note generation during this visit     CC:  Chief Complaint   Patient presents with    Lab Results       Maurilio Andres 39 y.o. male  patient presenting for     History of Present Illness  The patient presents for a follow-up on the echocardiogram and lab results.    He reports an episode of elevated heart rate during a work interview, which he attributes to anxiety related to the presence of a new supervisor. He experiences occasional palpitations, which he believes are anxiety-related. His anxiety is generally manageable, although he acknowledges that the recent episode was somewhat distressing. He does not experience any further dizziness or chest pain. He also does not report any symptoms related to his heart rate or blood pressure.    He has been monitoring his blood pressure at home, although not as frequently as recommended. His blood pressure readings have been in the 140s, which he notes were taken post-gym session and after consuming pre-workout supplements. He has not attempted to discontinue these supplements to observe any potential impact on his blood pressure. 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 He continues his regimen of lisinopril 10 mg.    He is hoping his next cholesterol level will be better as he is eating better.    FAMILY HISTORY  His sister has a history of a heart murmur. His father has a history of high cholesterol.    MEDICATIONS  lisinopril      Review of Systems   Constitutional:  Negative for chills, fever, malaise/fatigue and weight loss.   Respiratory:  Negative for shortness of breath.    Cardiovascular:  Negative for chest pain and palpitations.   Neurological:  Negative for dizziness and headaches.   Psychiatric/Behavioral:  The patient is nervous/anxious.        BP (!) 146/84   Pulse  "89   Temp 37.1 °C (98.8 °F) (Temporal)   Ht 1.778 m (5' 10\")   Wt 81.6 kg (180 lb)   SpO2 99% , Body mass index is 25.83 kg/m².    Physical Exam  Constitutional:       General: He is not in acute distress.     Appearance: Normal appearance. He is normal weight. He is not ill-appearing.   HENT:      Head: Normocephalic and atraumatic.   Pulmonary:      Effort: Pulmonary effort is normal.   Neurological:      Mental Status: He is alert and oriented to person, place, and time.   Psychiatric:         Mood and Affect: Mood normal.         Behavior: Behavior normal.         Thought Content: Thought content normal.         Judgment: Judgment normal.           Results  Imaging 1/15/2023  Echocardiogram shows normal left ventricle systolic function, ejection fraction of 59%, normal right ventricular size, no elevated right ventricular pressure, no pulmonary hypertension, and mild mitral regurgitation.    Assessment and Plan    Assessment & Plan  1. Elevated heart rate.  The echocardiogram results were reviewed, showing normal left ventricular systolic function, an ejection fraction of 59%, normal right ventricular size, no elevated right ventricular pressure, no pulmonary hypertension, and mild mitral regurgitation. The mitral regurgitation is not expected to cause an elevated heart rate. He was advised to discontinue pre-workout supplements for a few days to observe any changes in resting heart rate. Incorporating cardiovascular exercises into his routine was also recommended. If symptoms such as shortness of breath, lower extremity swelling, dizziness, palpitations, or increased fatigue occur, further evaluation will be necessary.    2. Anxiety.  He reported experiencing anxiety during specific situations, such as interviews. Propranolol was discussed as a potential treatment option for performance anxiety if his symptoms worsen. He was advised to monitor his symptoms and consider propranolol if needed.    3. " Hypertension.  He is currently taking lisinopril 10 mg and has been monitoring his blood pressure at home. Blood pressure readings have improved on this medication. He was advised to continue lisinopril 10 mg and maintain regular blood pressure monitoring.    4. Hypercholesterolemia.  Previous labs from June showed elevated cholesterol levels. He was advised to continue dietary modifications and physical activity. A lipid profile test will be conducted in the coming weeks. If cholesterol levels remain elevated, over-the-counter supplements or statins may be considered.       1. Dyslipidemia  Chronic stable condition  - Lipid Profile; Future    2. Essential hypertension  Chronic and stable condition  Continue lisinopril 10 mg daily          Discussed with patient possible alternative diagnoses, patient is to take all medications as prescribed.     If symptoms persist FU w/PCP, if symptoms worsen go to emergency room.     If experiencing any side effects from prescribed medications reports to the office immediately or go to emergency room.    Reviewed indication, dosage, usage and potential adverse effects of prescribed medications.     Reviewed risks and benefits of treatment plan. Patient verbalizes understanding of all instruction and verbally agrees to plan.    Return for Pending lipid profile.    This note was created using voice recognition software ([employer]). The accuracy of the dictation is limited by the abilities of the software. I have reviewed the note prior to signing, however some errors in grammar and context are still possible. If you have any questions related to this note please do not hesitate to contact our office.

## 2025-03-14 ENCOUNTER — HOSPITAL ENCOUNTER (OUTPATIENT)
Dept: LAB | Facility: MEDICAL CENTER | Age: 40
End: 2025-03-14
Attending: NURSE PRACTITIONER
Payer: COMMERCIAL

## 2025-03-14 DIAGNOSIS — E78.5 DYSLIPIDEMIA: ICD-10-CM

## 2025-03-14 LAB
CHOLEST SERPL-MCNC: 305 MG/DL (ref 100–199)
FASTING STATUS PATIENT QL REPORTED: NORMAL
HDLC SERPL-MCNC: 52 MG/DL
LDLC SERPL CALC-MCNC: 229 MG/DL
TRIGL SERPL-MCNC: 121 MG/DL (ref 0–149)

## 2025-03-14 PROCEDURE — 36415 COLL VENOUS BLD VENIPUNCTURE: CPT

## 2025-03-14 PROCEDURE — 80061 LIPID PANEL: CPT

## 2025-03-17 ENCOUNTER — RESULTS FOLLOW-UP (OUTPATIENT)
Dept: MEDICAL GROUP | Facility: PHYSICIAN GROUP | Age: 40
End: 2025-03-17
Payer: COMMERCIAL

## 2025-03-20 ENCOUNTER — APPOINTMENT (OUTPATIENT)
Dept: MEDICAL GROUP | Facility: PHYSICIAN GROUP | Age: 40
End: 2025-03-20
Payer: COMMERCIAL

## 2025-03-27 ENCOUNTER — OFFICE VISIT (OUTPATIENT)
Dept: MEDICAL GROUP | Facility: PHYSICIAN GROUP | Age: 40
End: 2025-03-27
Payer: COMMERCIAL

## 2025-03-27 VITALS
SYSTOLIC BLOOD PRESSURE: 128 MMHG | BODY MASS INDEX: 26.86 KG/M2 | TEMPERATURE: 97.9 F | HEART RATE: 64 BPM | OXYGEN SATURATION: 98 % | WEIGHT: 187.6 LBS | DIASTOLIC BLOOD PRESSURE: 64 MMHG | HEIGHT: 70 IN

## 2025-03-27 DIAGNOSIS — R04.0 FREQUENT EPISTAXIS: ICD-10-CM

## 2025-03-27 DIAGNOSIS — E78.5 DYSLIPIDEMIA: ICD-10-CM

## 2025-03-27 PROCEDURE — 3074F SYST BP LT 130 MM HG: CPT | Performed by: NURSE PRACTITIONER

## 2025-03-27 PROCEDURE — 3078F DIAST BP <80 MM HG: CPT | Performed by: NURSE PRACTITIONER

## 2025-03-27 PROCEDURE — 99214 OFFICE O/P EST MOD 30 MIN: CPT | Performed by: NURSE PRACTITIONER

## 2025-03-27 RX ORDER — ROSUVASTATIN CALCIUM 10 MG/1
10 TABLET, COATED ORAL EVERY EVENING
Qty: 100 TABLET | Refills: 3 | Status: SHIPPED | OUTPATIENT
Start: 2025-03-27 | End: 2026-05-01

## 2025-03-27 ASSESSMENT — ENCOUNTER SYMPTOMS
HEADACHES: 0
SHORTNESS OF BREATH: 0
DIZZINESS: 0
PALPITATIONS: 0
CHILLS: 0
FEVER: 0
WEIGHT LOSS: 0

## 2025-03-27 ASSESSMENT — FIBROSIS 4 INDEX: FIB4 SCORE: 0.68

## 2025-03-27 NOTE — PROGRESS NOTES
"Verbal consent was acquired by the patient to use [employer] ambient listening note generation during this visit     CC:  Chief Complaint   Patient presents with    Lab Results       Maurilio Andres 39 y.o. male  patient presenting for     History of Present Illness  The patient presents for evaluation of hypercholesterolemia, epistaxis, and gastroesophageal reflux disease.    He has reviewed his recent blood work results. He reports a generally healthy diet, with most meals prepared at home and minimal consumption of high-fat, processed, or fried foods. He occasionally indulges in chicken fried steak but does not make it a daily habit. His wife, who is health-conscious, ensures that their meals are balanced with a variety of vegetables. He avoids eating out during work hours, opting instead for leftovers or snacks from vending machines. He maintains a regular exercise routine and does not smoke.    He has a lifelong history of epistaxis, which he believes may have worsened with age and could be exacerbated by his dry work environment. He reports no other bleeding issues, such as prolonged bleeding from minor cuts or scrapes, and no presence of blood in his stool. He also reports no correlation between elevated blood pressure and headaches. He takes vitamin K to manage this condition, noting that if he misses a dose or two, he will experience a nosebleed within 3 to 4 days. However, consistent use of vitamin K prevents these episodes.        Review of Systems   Constitutional:  Negative for chills, fever, malaise/fatigue and weight loss.   Respiratory:  Negative for shortness of breath.    Cardiovascular:  Negative for chest pain and palpitations.   Neurological:  Negative for dizziness and headaches.       /64 (BP Location: Left arm, Patient Position: Sitting, BP Cuff Size: Adult)   Pulse 64   Temp 36.6 °C (97.9 °F)   Ht 1.778 m (5' 10\")   Wt 85.1 kg (187 lb 9.6 oz)   SpO2 98% , Body mass index is 26.92 " kg/m².    Physical Exam  Constitutional:       General: He is not in acute distress.     Appearance: Normal appearance. He is normal weight. He is not ill-appearing.   HENT:      Head: Normocephalic and atraumatic.   Pulmonary:      Effort: Pulmonary effort is normal.   Neurological:      Mental Status: He is alert and oriented to person, place, and time.   Psychiatric:         Mood and Affect: Mood normal.         Behavior: Behavior normal.         Thought Content: Thought content normal.         Judgment: Judgment normal.           Results  Laboratory Studies  Cholesterol 305. Triglycerides 121. HDL 52. .  Platelets were 300 in November.    Assessment and Plan    Assessment & Plan  1. Hypercholesterolemia.  His cholesterol levels have shown a significant increase over the past year, with a current reading of 305. Triglycerides have improved to 121, while HDL has increased to 52. However, there is a notable rise in LDL levels to 229. Given his family history of hypercholesterolemia, it is plausible that he may be struggling with cholesterol management due to genetic predisposition. A comprehensive discussion was held regarding the potential benefits of statin therapy in reducing cholesterol levels and preventing plaque formation. The possibility of familial hypercholesterolemia was also considered. He will be initiated on Crestor, a statin medication, to be taken at night. A CT cardiac score will be ordered to assess for calcium buildup and plaque formation within the vessels. Non-fasting blood work will be conducted in 1 to 2 weeks to further evaluate his cholesterol levels. The prescription for Crestor has been sent to [facility]. He has been advised to report any side effects such as muscle fatigue or joint complaints. If these occur, the dosage may be adjusted, or an alternative statin may be considered. CoQ10 supplementation may also be added to help alleviate muscle fatigue.    2. Epistaxis.  He  reports a history of nosebleeds that improve with vitamin K supplementation. His platelet count was normal at 300 in November. A PT, PTT, and factor V test will be ordered to evaluate for any underlying genetic causes of increased bleeding. He has been advised to continue taking vitamin K as stopping it may affect the test results.    3. Gastroesophageal reflux disease.  He is on omeprazole and is doing well.     1. Dyslipidemia  Chronic and exacerbated condition  - rosuvastatin (CRESTOR) 10 MG Tab; Take 1 Tablet by mouth every evening.  Dispense: 100 Tablet; Refill: 3  - CT-CARDIAC SCORING; Future  - APOLIPOPROTEIN B; Future  - Lipoprotein (a); Future  - CRP HIGH SENSITIVE (CARDIAC); Future    2. Frequent epistaxis  Chronic and stable condition  - FACTOR V LEIDEN MUTATION; Future  - APTT; Future  - Prothrombin Time; Future       Discussed with patient possible alternative diagnoses, patient is to take all medications as prescribed.     If symptoms persist FU w/PCP, if symptoms worsen go to emergency room.     If experiencing any side effects from prescribed medications reports to the office immediately or go to emergency room.    Reviewed indication, dosage, usage and potential adverse effects of prescribed medications.     Reviewed risks and benefits of treatment plan. Patient verbalizes understanding of all instruction and verbally agrees to plan.    Return for pending labs and imaging.    This note was created using voice recognition software ([employer]). The accuracy of the dictation is limited by the abilities of the software. I have reviewed the note prior to signing, however some errors in grammar and context are still possible. If you have any questions related to this note please do not hesitate to contact our office.

## 2025-04-04 ENCOUNTER — APPOINTMENT (OUTPATIENT)
Dept: MEDICAL GROUP | Facility: PHYSICIAN GROUP | Age: 40
End: 2025-04-04
Payer: COMMERCIAL

## 2025-06-05 DIAGNOSIS — Z00.00 BLOOD TESTS FOR ROUTINE GENERAL PHYSICAL EXAMINATION: ICD-10-CM

## 2025-06-05 DIAGNOSIS — R04.0 EPISTAXIS: Primary | ICD-10-CM

## 2025-06-06 ENCOUNTER — HOSPITAL ENCOUNTER (OUTPATIENT)
Dept: LAB | Facility: MEDICAL CENTER | Age: 40
End: 2025-06-06
Attending: NURSE PRACTITIONER
Payer: COMMERCIAL

## 2025-06-06 DIAGNOSIS — E78.5 DYSLIPIDEMIA: ICD-10-CM

## 2025-06-06 DIAGNOSIS — R04.0 FREQUENT EPISTAXIS: ICD-10-CM

## 2025-06-06 DIAGNOSIS — R04.0 EPISTAXIS: ICD-10-CM

## 2025-06-06 DIAGNOSIS — Z00.00 BLOOD TESTS FOR ROUTINE GENERAL PHYSICAL EXAMINATION: ICD-10-CM

## 2025-06-06 LAB
25(OH)D3 SERPL-MCNC: 90 NG/ML (ref 30–100)
APTT PPP: 30.8 SEC (ref 24.7–36)
CRP SERPL HS-MCNC: 1.1 MG/L (ref 0–3)
INR PPP: 1.05 (ref 0.87–1.13)
PROTHROMBIN TIME: 13.7 SEC (ref 12–14.6)

## 2025-06-06 PROCEDURE — 86141 C-REACTIVE PROTEIN HS: CPT

## 2025-06-06 PROCEDURE — 84270 ASSAY OF SEX HORMONE GLOBUL: CPT

## 2025-06-06 PROCEDURE — 84402 ASSAY OF FREE TESTOSTERONE: CPT

## 2025-06-06 PROCEDURE — 84403 ASSAY OF TOTAL TESTOSTERONE: CPT

## 2025-06-06 PROCEDURE — 82306 VITAMIN D 25 HYDROXY: CPT

## 2025-06-06 PROCEDURE — 84597 ASSAY OF VITAMIN K: CPT

## 2025-06-06 PROCEDURE — 36415 COLL VENOUS BLD VENIPUNCTURE: CPT

## 2025-06-06 PROCEDURE — 83695 ASSAY OF LIPOPROTEIN(A): CPT

## 2025-06-06 PROCEDURE — 81241 F5 GENE: CPT

## 2025-06-06 PROCEDURE — 85730 THROMBOPLASTIN TIME PARTIAL: CPT

## 2025-06-06 PROCEDURE — 82172 ASSAY OF APOLIPOPROTEIN: CPT

## 2025-06-06 PROCEDURE — 85610 PROTHROMBIN TIME: CPT

## 2025-06-09 LAB
APO B100 SERPL-MCNC: 108 MG/DL (ref 66–133)
LPA SERPL-MCNC: 50 MG/DL

## 2025-06-10 LAB
SHBG SERPL-SCNC: 21 NMOL/L (ref 17–56)
TESTOST FREE MFR SERPL: ABNORMAL % (ref 1.6–2.9)
TESTOST FREE SERPL-MCNC: ABNORMAL PG/ML (ref 47–244)
TESTOST SERPL-MCNC: >1500 NG/DL (ref 300–890)

## 2025-06-11 LAB — F5 P.R506Q BLD/T QL: NEGATIVE

## 2025-06-16 LAB — PHYTONADIONE SERPL-MCNC: 1.45 NMOL/L (ref 0.22–4.88)

## 2025-06-26 ENCOUNTER — RESULTS FOLLOW-UP (OUTPATIENT)
Dept: MEDICAL GROUP | Facility: PHYSICIAN GROUP | Age: 40
End: 2025-06-26

## 2025-06-26 DIAGNOSIS — R79.89 ELEVATED TESTOSTERONE LEVEL IN MALE: Primary | ICD-10-CM
